# Patient Record
Sex: MALE | Race: WHITE | NOT HISPANIC OR LATINO | Employment: OTHER | URBAN - METROPOLITAN AREA
[De-identification: names, ages, dates, MRNs, and addresses within clinical notes are randomized per-mention and may not be internally consistent; named-entity substitution may affect disease eponyms.]

---

## 2017-02-22 ENCOUNTER — OFFICE VISIT (OUTPATIENT)
Dept: OPTOMETRY | Facility: CLINIC | Age: 52
End: 2017-02-22
Payer: MEDICARE

## 2017-02-22 DIAGNOSIS — H52.13 MYOPIA WITH ASTIGMATISM AND PRESBYOPIA, BILATERAL: ICD-10-CM

## 2017-02-22 DIAGNOSIS — H52.203 MYOPIA WITH ASTIGMATISM AND PRESBYOPIA, BILATERAL: ICD-10-CM

## 2017-02-22 DIAGNOSIS — H25.13 NUCLEAR SCLEROSIS, BILATERAL: Primary | ICD-10-CM

## 2017-02-22 DIAGNOSIS — H52.4 MYOPIA WITH ASTIGMATISM AND PRESBYOPIA, BILATERAL: ICD-10-CM

## 2017-02-22 PROCEDURE — 92004 COMPRE OPH EXAM NEW PT 1/>: CPT | Mod: S$PBB,,, | Performed by: OPTOMETRIST

## 2017-02-22 PROCEDURE — 92015 DETERMINE REFRACTIVE STATE: CPT | Mod: ,,, | Performed by: OPTOMETRIST

## 2017-02-22 PROCEDURE — 99999 PR PBB SHADOW E&M-EST. PATIENT-LVL II: CPT | Mod: PBBFAC,,, | Performed by: OPTOMETRIST

## 2017-02-22 PROCEDURE — 99212 OFFICE O/P EST SF 10 MIN: CPT | Mod: PBBFAC | Performed by: OPTOMETRIST

## 2017-02-22 NOTE — PROGRESS NOTES
HPI     Pt states: blurred vision for reading, has to take his glasses off to   read clearly. Would like to update glasses. No other ocular complaints  PATIENT DENIES FLASHES, FLOATERS, PAIN OR DIPLOPIA   PATIENT'S LAST DFE WAS 2/5/2014       Last edited by Kandace Beasley, PCT on 2/22/2017  2:21 PM.     ROS     Positive for: Eyes    Negative for: Constitutional, Gastrointestinal, Neurological, Skin,   Genitourinary, Musculoskeletal, HENT, Endocrine, Cardiovascular,   Respiratory, Psychiatric, Allergic/Imm, Heme/Lymph    Last edited by Denisha Cerda, OD on 2/22/2017  2:58 PM. (History)        Assessment /Plan     For exam results, see Encounter Report.    Nuclear sclerosis, bilateral    Myopia with astigmatism and presbyopia, bilateral            1.  Early-monitor.  Eye health normal OU.  2.  Bifocal rx given        RTC 1 year for routine exam.

## 2019-12-04 ENCOUNTER — HOSPITAL ENCOUNTER (OUTPATIENT)
Dept: RADIOLOGY | Facility: OTHER | Age: 54
Discharge: HOME | End: 2019-12-04
Attending: INTERNAL MEDICINE
Payer: MEDICARE

## 2019-12-04 ENCOUNTER — ANESTHESIA EVENT (OUTPATIENT)
Dept: SURGERY | Facility: OTHER | Age: 54
DRG: 470 | End: 2019-12-04
Payer: MEDICARE

## 2019-12-04 ENCOUNTER — HOSPITAL ENCOUNTER (OUTPATIENT)
Dept: PREADMISSION TESTING | Facility: OTHER | Age: 54
Discharge: HOME | End: 2019-12-04
Attending: ORTHOPAEDIC SURGERY
Payer: MEDICARE

## 2019-12-04 VITALS
RESPIRATION RATE: 16 BRPM | HEART RATE: 77 BPM | DIASTOLIC BLOOD PRESSURE: 80 MMHG | TEMPERATURE: 99 F | HEIGHT: 70 IN | SYSTOLIC BLOOD PRESSURE: 152 MMHG | OXYGEN SATURATION: 97 % | BODY MASS INDEX: 22.9 KG/M2 | WEIGHT: 160 LBS

## 2019-12-04 DIAGNOSIS — R91.8 ABNORMAL X-RAY OF LUNG: Primary | ICD-10-CM

## 2019-12-04 DIAGNOSIS — R91.8 ABNORMAL X-RAY OF LUNG: ICD-10-CM

## 2019-12-04 DIAGNOSIS — M87.051 AVASCULAR NECROSIS OF HIP, RIGHT: Primary | ICD-10-CM

## 2019-12-04 LAB
ABO + RH BLD: NORMAL
ALBUMIN SERPL BCP-MCNC: 4 G/DL (ref 3.5–5.2)
ALP SERPL-CCNC: 50 U/L (ref 55–135)
ALT SERPL W/O P-5'-P-CCNC: 16 U/L (ref 10–44)
ANION GAP SERPL CALC-SCNC: 9 MMOL/L (ref 8–16)
AST SERPL-CCNC: 21 U/L (ref 10–40)
BASOPHILS # BLD AUTO: 0.04 K/UL (ref 0–0.2)
BASOPHILS NFR BLD: 0.7 % (ref 0–1.9)
BILIRUB SERPL-MCNC: 0.5 MG/DL (ref 0.1–1)
BILIRUB UR QL STRIP: NEGATIVE
BLD GP AB SCN CELLS X3 SERPL QL: NORMAL
BUN SERPL-MCNC: 14 MG/DL (ref 6–20)
CALCIUM SERPL-MCNC: 9.3 MG/DL (ref 8.7–10.5)
CHLORIDE SERPL-SCNC: 102 MMOL/L (ref 95–110)
CLARITY UR: CLEAR
CO2 SERPL-SCNC: 26 MMOL/L (ref 23–29)
COLOR UR: YELLOW
CREAT SERPL-MCNC: 1.1 MG/DL (ref 0.5–1.4)
DIFFERENTIAL METHOD: ABNORMAL
EOSINOPHIL # BLD AUTO: 0 K/UL (ref 0–0.5)
EOSINOPHIL NFR BLD: 0.7 % (ref 0–8)
ERYTHROCYTE [DISTWIDTH] IN BLOOD BY AUTOMATED COUNT: 16.8 % (ref 11.5–14.5)
EST. GFR  (AFRICAN AMERICAN): >60 ML/MIN/1.73 M^2
EST. GFR  (NON AFRICAN AMERICAN): >60 ML/MIN/1.73 M^2
GLUCOSE SERPL-MCNC: 96 MG/DL (ref 70–110)
GLUCOSE UR QL STRIP: ABNORMAL
HCT VFR BLD AUTO: 48.2 % (ref 40–54)
HGB BLD-MCNC: 15.7 G/DL (ref 14–18)
HGB UR QL STRIP: NEGATIVE
IMM GRANULOCYTES # BLD AUTO: 0.01 K/UL (ref 0–0.04)
IMM GRANULOCYTES NFR BLD AUTO: 0.2 % (ref 0–0.5)
KETONES UR QL STRIP: NEGATIVE
LEUKOCYTE ESTERASE UR QL STRIP: NEGATIVE
LYMPHOCYTES # BLD AUTO: 1.7 K/UL (ref 1–4.8)
LYMPHOCYTES NFR BLD: 28.2 % (ref 18–48)
MCH RBC QN AUTO: 30.7 PG (ref 27–31)
MCHC RBC AUTO-ENTMCNC: 32.6 G/DL (ref 32–36)
MCV RBC AUTO: 94 FL (ref 82–98)
MONOCYTES # BLD AUTO: 0.5 K/UL (ref 0.3–1)
MONOCYTES NFR BLD: 8.3 % (ref 4–15)
NEUTROPHILS # BLD AUTO: 3.8 K/UL (ref 1.8–7.7)
NEUTROPHILS NFR BLD: 61.9 % (ref 38–73)
NITRITE UR QL STRIP: NEGATIVE
NRBC BLD-RTO: 0 /100 WBC
PH UR STRIP: 6 [PH] (ref 5–8)
PLATELET # BLD AUTO: 164 K/UL (ref 150–350)
PMV BLD AUTO: 10.8 FL (ref 9.2–12.9)
POTASSIUM SERPL-SCNC: 4.1 MMOL/L (ref 3.5–5.1)
PROT SERPL-MCNC: 7.4 G/DL (ref 6–8.4)
PROT UR QL STRIP: NEGATIVE
RBC # BLD AUTO: 5.12 M/UL (ref 4.6–6.2)
SODIUM SERPL-SCNC: 137 MMOL/L (ref 136–145)
SP GR UR STRIP: 1.01 (ref 1–1.03)
URN SPEC COLLECT METH UR: ABNORMAL
UROBILINOGEN UR STRIP-ACNC: NEGATIVE EU/DL
WBC # BLD AUTO: 6.06 K/UL (ref 3.9–12.7)

## 2019-12-04 PROCEDURE — 71046 X-RAY EXAM CHEST 2 VIEWS: CPT | Mod: 26,,, | Performed by: RADIOLOGY

## 2019-12-04 PROCEDURE — 71046 X-RAY EXAM CHEST 2 VIEWS: CPT | Mod: TC,FY

## 2019-12-04 PROCEDURE — 85025 COMPLETE CBC W/AUTO DIFF WBC: CPT

## 2019-12-04 PROCEDURE — 71046 XR CHEST PA AND LATERAL: ICD-10-PCS | Mod: 26,,, | Performed by: RADIOLOGY

## 2019-12-04 PROCEDURE — 86850 RBC ANTIBODY SCREEN: CPT

## 2019-12-04 PROCEDURE — 36415 COLL VENOUS BLD VENIPUNCTURE: CPT

## 2019-12-04 PROCEDURE — 80053 COMPREHEN METABOLIC PANEL: CPT

## 2019-12-04 PROCEDURE — 81003 URINALYSIS AUTO W/O SCOPE: CPT

## 2019-12-04 RX ORDER — TESTOSTERONE CYPIONATE 1000 MG/10ML
160 INJECTION, SOLUTION INTRAMUSCULAR WEEKLY
COMMUNITY

## 2019-12-04 RX ORDER — PREGABALIN 75 MG/1
75 CAPSULE ORAL
Status: CANCELLED | OUTPATIENT
Start: 2019-12-04 | End: 2019-12-04

## 2019-12-04 RX ORDER — ACETAMINOPHEN 500 MG
1000 TABLET ORAL
Status: CANCELLED | OUTPATIENT
Start: 2019-12-04 | End: 2019-12-04

## 2019-12-04 RX ORDER — FAMOTIDINE 20 MG/1
20 TABLET, FILM COATED ORAL
Status: CANCELLED | OUTPATIENT
Start: 2019-12-04 | End: 2019-12-04

## 2019-12-04 RX ORDER — SODIUM CHLORIDE, SODIUM LACTATE, POTASSIUM CHLORIDE, CALCIUM CHLORIDE 600; 310; 30; 20 MG/100ML; MG/100ML; MG/100ML; MG/100ML
INJECTION, SOLUTION INTRAVENOUS CONTINUOUS
Status: CANCELLED | OUTPATIENT
Start: 2019-12-04

## 2019-12-04 RX ORDER — OXYCODONE HYDROCHLORIDE 5 MG/1
10 CAPSULE ORAL EVERY 4 HOURS PRN
COMMUNITY

## 2019-12-04 RX ORDER — LIDOCAINE HYDROCHLORIDE 10 MG/ML
0.5 INJECTION, SOLUTION EPIDURAL; INFILTRATION; INTRACAUDAL; PERINEURAL ONCE
Status: CANCELLED | OUTPATIENT
Start: 2019-12-04 | End: 2019-12-04

## 2019-12-04 RX ORDER — OXYCODONE HCL 10 MG/1
10 TABLET, FILM COATED, EXTENDED RELEASE ORAL 4 TIMES DAILY
COMMUNITY

## 2019-12-04 RX ORDER — ACETAMINOPHEN 500 MG
1000 TABLET ORAL EVERY 6 HOURS PRN
Status: ON HOLD | COMMUNITY
End: 2023-06-13 | Stop reason: HOSPADM

## 2019-12-04 RX ORDER — CELECOXIB 200 MG/1
400 CAPSULE ORAL
Status: CANCELLED | OUTPATIENT
Start: 2019-12-04 | End: 2019-12-04

## 2019-12-04 NOTE — DISCHARGE INSTRUCTIONS
PRE-ADMIT TESTING -  [phone]    [address]  [facility]          Your surgery has been scheduled at [facility]. We are pleased to have the opportunity to serve you. For Further Information please call [phone].    On the day of surgery please report to the Information Desk on the 1st floor.    · CONTACT YOUR PHYSICIAN'S OFFICE THE DAY PRIOR TO YOUR SURGERY TO OBTAIN YOUR ARRIVAL TIME.     · The evening before surgery do not eat anything after 9 p.m. ( this includes hard candy, chewing gum and mints).  You may only have GATORADE, POWERADE AND WATER  from 9 p.m. until you leave your home.   DO NOT DRINK ANY LIQUIDS ON THE WAY TO THE HOSPITAL.      SPECIAL MEDICATION INSTRUCTIONS: TAKE medications checked off by the Anesthesiologist on your Medication List.    Angiogram Patients: Take medications as instructed by your physician, including aspirin.     Surgery Patients:    If you take ASPIRIN - Your PHYSICIAN/SURGEON will need to inform you IF/OR when you need to stop taking aspirin prior to your surgery.     Do Not take any medications containing IBUPROFEN.  Do Not Wear any make-up or dark nail polish   (especially eye make-up) to surgery. If you come to surgery with makeup on you will be required to remove the makeup or nail polish.    Do not shave your surgical area at least 5 days prior to your surgery. The surgical prep will be performed at the hospital according to Infection Control regulations.    Leave all valuables at home.   Do Not wear any jewelry or watches, including any metal in body piercings. Jewelry must be removed prior to coming to the hospital.  There is a possibility that rings that are unable to be removed may be cut off if they are on the surgical extremity.    Contact Lens must be removed before surgery. Either do not wear the contact lens or bring a case and solution for storage.  Please bring a container for eyeglasses or dentures as required.  Bring  any paperwork your physician has provided, such as consent forms,  history and physicals, doctor's orders, etc.   Bring comfortable clothes that are loose fitting to wear upon discharge. Take into consideration the type of surgery being performed.  Maintain your diet as advised per your physician the day prior to surgery.      Adequate rest the night before surgery is advised.   Park in the Parking lot behind the hospital or in the [facility] across the street from the parking lot. Parking is complimentary.  If you will be discharged the same day as your procedure, please arrange for a responsible adult to drive you home or to accompany you if traveling by taxi.   YOU WILL NOT BE PERMITTED TO DRIVE OR TO LEAVE THE HOSPITAL ALONE AFTER SURGERY.   It is strongly recommended that you arrange for someone to remain with you for the first 24 hrs following your surgery.    The Surgeon will speak to your family/visitor after your surgery regarding the outcome of your surgery and post op care.  The Surgeon may speak to you after your surgery, but there is a possibility you may not remember the details.  Please check with your family members regarding the conversation with the Surgeon.    We strongly recommend whoever is bringing you home be present for discharge instructions.  This will ensure a thorough understanding for your post op home care.    EACH PATIENT IS ALLOWED TWO FAMILY MEMBERS OR VISITORS IN THE ROOM AND IN THE WAITING ROOMS WHILE YOU ARE IN SURGERY. ALL CHILDREN MUST ALWAYS BE ACCOMPANIED BY AN ADULT.    Thank you for your cooperation.  The Staff of [facility].                Bathing Instructions with Hibiclens     Shower the evening before and morning of your procedure with Hibiclens:   Wash your face with water and your regular face wash/soap   Apply Hibiclens directly on your skin or on a wet washcloth and wash gently. When showering: Move away from the shower stream when  applying Hibiclens to avoid rinsing off too soon.   Rinse thoroughly with warm water   Do not dilute Hibiclens         Dry off as usual, do not use any deodorant, powder, body lotions, perfume, after shave or cologne.

## 2019-12-04 NOTE — ANESTHESIA PREPROCEDURE EVALUATION
12/04/2019  Blaise Louis is a 54 y.o., male.    Anesthesia Evaluation    I have reviewed the Patient Summary Reports.    I have reviewed the Nursing Notes.   I have reviewed the Medications.     Review of Systems  Anesthesia Hx:  No problems with previous Anesthesia    Social:  Former Smoker, No Alcohol Use Marijuana   Hematology/Oncology:  Hematology Normal   Oncology Normal     Cardiovascular:  Cardiovascular Normal Exercise tolerance: good     Pulmonary:  Pulmonary Normal    Renal/:   Chronic Renal Disease renal calculi    Hepatic/GI:   Liver Disease, Hepatitis, C    Musculoskeletal:   Arthritis   Spine Disorders: lumbar Chronic Pain    Neurological:   Chronic Pain Syndrome   Endocrine:  Endocrine Normal    Dermatological:  Skin Normal    Psych:   Opioid addition. On Methadone.         Physical Exam  General:  Well nourished    Airway/Jaw/Neck:  Airway Findings: Mouth Opening: Normal Tongue: Normal  General Airway Assessment: Adult, Good  Mallampati: I  TM Distance: Normal, at least 6 cm  Jaw/Neck Findings:  Neck ROM: Normal ROM      Dental:  Dental Findings: In tact, Lower retainer        Mental Status:  Mental Status Findings:  Cooperative, Alert and Oriented         Anesthesia Plan  Type of Anesthesia, risks & benefits discussed:  Anesthesia Type:  general  Patient's Preference: patients requests general anesthesia over spinal  Intra-op Monitoring Plan: standard ASA monitors  Intra-op Monitoring Plan Comments:   Post Op Pain Control Plan: per primary service following discharge from PACU and multimodal analgesia  Post Op Pain Control Plan Comments:   Induction:   IV  Beta Blocker:         Informed Consent: Patient understands risks and agrees with Anesthesia plan.  Questions answered. Anesthesia consent signed with patient.  ASA Score: 2     Day of Surgery Review of History & Physical:    H&P  update referred to the surgeon.     Anesthesia Plan Notes: Clearance on paper chart. Labs pending.        Ready For Surgery From Anesthesia Perspective.

## 2019-12-06 NOTE — NURSING
Total Joint Replacement Questionnaire     Blaise Louis participated in Joint Class Dec 4    1. Have you ever had a Joint Replacement?   []Yes  [x] No    2. How many stairs/steps do you have to enter your home? 14  When going up, on what side is the railing?  [x] Left  [] Right  [] Bilateral  [] None    3. Do you own any Durable Medical Equipment?   [] Rolling Walker  [] Standard Walker  [] Rollator  [] Cane  [] Crutches  [] Bed Side Commode  [] Hip Kit  [] Tub Transfer Bench  [] Shower Chair     4. Have you used a Home Health Company before?   [x] Yes  [] No  If Yes, Name of Company: ?  Would you like to use this company again?   [] Yes  [] No  [] Would you like to use your physician's preference?  [] Yes  [] No    5. Have you arranged for someone to help you, for at least for 3 days, when you are discharged from the hospital?  [x] Yes  [] No  If Yes, Name(s) of person assisting: Renée Duran  12/6/2019

## 2019-12-06 NOTE — DISCHARGE INSTRUCTIONS
Hip Replacement Discharge Instructions    1. Pain:  a. After surgery you may feel some pain in the operative leg groin area. This is normal. Your hip will likely have been injected with a numbing medicine (Exparel) prior to completion of surgery for pain control. This is indicated on a green bracelet that you will wear for 4 days after surgery. You will also get a prescription for pain control before you leave the hospital.  b. Elevate your leg when sitting for comfort  2. Incision Care:  a. Some drainage from the incision in the first 72 hours is normal. If drainage is excessive, remove bandage,  pat dry, cover with sterile gauze, and secure with tape. Notify M.D. for excessive drainage.  b. Staples will be removed 14 days after surgery.  3. Activity:  a. See attached hip precautions and follow for 6 weeks (instructions found at the end of packet):  b. Perform exercises 3 times a day.  c. Use a hard, flat surface, such as a firm mattress, when exercising.  d. You may shower 2 days after surgery providing the dressing is waterproof. Support/help is mandatory during showering. If dressing becomes wet, replace with a new dressing. No bathing or swimming for 6 weeks or until incision is completely healed.  e. Wear arnaldo hose for 3 weeks after surgery. You may remove for 1-2 hours during the day only.Send patient home with an extra pair arnaldo hose.  4. Safety:  a. Add cushions to low chairs and car seats for elevation.  b. When getting in and out of a car, it is important to keep your leg straight and out to the side. Wear a seatbelt at all times.  c. You will be given a raised toilet seat (3-1 commode).  d. Use a walker, cane, or crutches as long as M.D. recommends.  5. Possible Complications: Report to Surgeon  a. Infection  i. Unexpected redness  ii. Persistent drainage  iii. Temperature ,can be treated with tylenol. Do not go to the emergency room or urgent care for a temperature, call your surgeon.   iii. Additional  swelling  iv. Pain not controlled with current pain medicine  b. Blood clot  i. Unusual pain  ii. Red or discolored skin  iii. Swelling  iv. Unusual warm skin  c. Dislocation  i. Severe hip pain especially when moved  ii. The injured leg is shorter than the uninjured leg  iii. The injured leg lies in an abnormal position. In most cases the leg is bent at the hip, turned inward and pulled toward the middle of the body.

## 2019-12-09 ENCOUNTER — HOSPITAL ENCOUNTER (INPATIENT)
Facility: OTHER | Age: 54
LOS: 1 days | Discharge: HOME HEALTH | DRG: 470 | End: 2019-12-10
Attending: ORTHOPAEDIC SURGERY | Admitting: ORTHOPAEDIC SURGERY
Payer: MEDICARE

## 2019-12-09 ENCOUNTER — ANESTHESIA (OUTPATIENT)
Dept: SURGERY | Facility: OTHER | Age: 54
DRG: 470 | End: 2019-12-09
Payer: MEDICARE

## 2019-12-09 DIAGNOSIS — M87.051 AVASCULAR NECROSIS OF HIP, RIGHT: Primary | ICD-10-CM

## 2019-12-09 PROCEDURE — 25000003 PHARM REV CODE 250: Performed by: NURSE ANESTHETIST, CERTIFIED REGISTERED

## 2019-12-09 PROCEDURE — 63600175 PHARM REV CODE 636 W HCPCS: Performed by: SPECIALIST

## 2019-12-09 PROCEDURE — 11000001 HC ACUTE MED/SURG PRIVATE ROOM

## 2019-12-09 PROCEDURE — S0109 METHADONE ORAL 5MG: HCPCS

## 2019-12-09 PROCEDURE — 63600175 PHARM REV CODE 636 W HCPCS

## 2019-12-09 PROCEDURE — 63600175 PHARM REV CODE 636 W HCPCS: Performed by: ORTHOPAEDIC SURGERY

## 2019-12-09 PROCEDURE — 63600175 PHARM REV CODE 636 W HCPCS: Performed by: NURSE ANESTHETIST, CERTIFIED REGISTERED

## 2019-12-09 PROCEDURE — 27201423 OPTIME MED/SURG SUP & DEVICES STERILE SUPPLY: Performed by: ORTHOPAEDIC SURGERY

## 2019-12-09 PROCEDURE — 25000003 PHARM REV CODE 250: Performed by: NURSE PRACTITIONER

## 2019-12-09 PROCEDURE — 25000003 PHARM REV CODE 250: Performed by: SPECIALIST

## 2019-12-09 PROCEDURE — 37000009 HC ANESTHESIA EA ADD 15 MINS: Performed by: ORTHOPAEDIC SURGERY

## 2019-12-09 PROCEDURE — 25000003 PHARM REV CODE 250: Performed by: ORTHOPAEDIC SURGERY

## 2019-12-09 PROCEDURE — 97116 GAIT TRAINING THERAPY: CPT

## 2019-12-09 PROCEDURE — 36000710: Performed by: ORTHOPAEDIC SURGERY

## 2019-12-09 PROCEDURE — 94799 UNLISTED PULMONARY SVC/PX: CPT

## 2019-12-09 PROCEDURE — 25000003 PHARM REV CODE 250: Performed by: ANESTHESIOLOGY

## 2019-12-09 PROCEDURE — 97530 THERAPEUTIC ACTIVITIES: CPT

## 2019-12-09 PROCEDURE — 71000039 HC RECOVERY, EACH ADD'L HOUR: Performed by: ORTHOPAEDIC SURGERY

## 2019-12-09 PROCEDURE — 94761 N-INVAS EAR/PLS OXIMETRY MLT: CPT

## 2019-12-09 PROCEDURE — 36000711: Performed by: ORTHOPAEDIC SURGERY

## 2019-12-09 PROCEDURE — 71000033 HC RECOVERY, INTIAL HOUR: Performed by: ORTHOPAEDIC SURGERY

## 2019-12-09 PROCEDURE — C1713 ANCHOR/SCREW BN/BN,TIS/BN: HCPCS | Performed by: ORTHOPAEDIC SURGERY

## 2019-12-09 PROCEDURE — C9290 INJ, BUPIVACAINE LIPOSOME: HCPCS | Performed by: ORTHOPAEDIC SURGERY

## 2019-12-09 PROCEDURE — 97161 PT EVAL LOW COMPLEX 20 MIN: CPT

## 2019-12-09 PROCEDURE — 37000008 HC ANESTHESIA 1ST 15 MINUTES: Performed by: ORTHOPAEDIC SURGERY

## 2019-12-09 PROCEDURE — 25000003 PHARM REV CODE 250

## 2019-12-09 PROCEDURE — C1776 JOINT DEVICE (IMPLANTABLE): HCPCS | Performed by: ORTHOPAEDIC SURGERY

## 2019-12-09 PROCEDURE — 63600175 PHARM REV CODE 636 W HCPCS: Performed by: ANESTHESIOLOGY

## 2019-12-09 DEVICE — LINER POLY 36MM: Type: IMPLANTABLE DEVICE | Site: HIP | Status: FUNCTIONAL

## 2019-12-09 DEVICE — STEM FEMORAL 12/14 12X128MM TT: Type: IMPLANTABLE DEVICE | Site: HIP | Status: FUNCTIONAL

## 2019-12-09 DEVICE — SCREW BONE 6.5X30 SELF-TAP: Type: IMPLANTABLE DEVICE | Site: HIP | Status: FUNCTIONAL

## 2019-12-09 DEVICE — SCREW BONE SELF TAP 6.5X40: Type: IMPLANTABLE DEVICE | Site: HIP | Status: FUNCTIONAL

## 2019-12-09 DEVICE — CUP SHELL TM MOD W/CLUST 50MM: Type: IMPLANTABLE DEVICE | Site: HIP | Status: FUNCTIONAL

## 2019-12-09 DEVICE — HEAD FEM BIOLOX DELTA 36X-3.5: Type: IMPLANTABLE DEVICE | Site: HIP | Status: FUNCTIONAL

## 2019-12-09 RX ORDER — TRANEXAMIC ACID 100 MG/ML
INJECTION, SOLUTION INTRAVENOUS
Status: DISCONTINUED | OUTPATIENT
Start: 2019-12-09 | End: 2019-12-09

## 2019-12-09 RX ORDER — CELECOXIB 200 MG/1
200 CAPSULE ORAL 2 TIMES DAILY
Status: DISCONTINUED | OUTPATIENT
Start: 2019-12-09 | End: 2019-12-10 | Stop reason: HOSPADM

## 2019-12-09 RX ORDER — CELECOXIB 200 MG/1
400 CAPSULE ORAL
Status: COMPLETED | OUTPATIENT
Start: 2019-12-09 | End: 2019-12-09

## 2019-12-09 RX ORDER — METHADONE HYDROCHLORIDE 5 MG/5ML
30 SOLUTION ORAL 3 TIMES DAILY
Status: DISCONTINUED | OUTPATIENT
Start: 2019-12-09 | End: 2019-12-10 | Stop reason: HOSPADM

## 2019-12-09 RX ORDER — FAMOTIDINE 20 MG/1
20 TABLET, FILM COATED ORAL
Status: COMPLETED | OUTPATIENT
Start: 2019-12-09 | End: 2019-12-09

## 2019-12-09 RX ORDER — HYDROMORPHONE HYDROCHLORIDE 2 MG/ML
INJECTION, SOLUTION INTRAMUSCULAR; INTRAVENOUS; SUBCUTANEOUS
Status: DISCONTINUED | OUTPATIENT
Start: 2019-12-09 | End: 2019-12-09

## 2019-12-09 RX ORDER — BACITRACIN 50000 [IU]/1
INJECTION, POWDER, FOR SOLUTION INTRAMUSCULAR
Status: DISCONTINUED | OUTPATIENT
Start: 2019-12-09 | End: 2019-12-09 | Stop reason: HOSPADM

## 2019-12-09 RX ORDER — POLYETHYLENE GLYCOL 3350 17 G/17G
17 POWDER, FOR SOLUTION ORAL DAILY
Status: DISCONTINUED | OUTPATIENT
Start: 2019-12-09 | End: 2019-12-10 | Stop reason: HOSPADM

## 2019-12-09 RX ORDER — PREGABALIN 75 MG/1
75 CAPSULE ORAL
Status: COMPLETED | OUTPATIENT
Start: 2019-12-09 | End: 2019-12-09

## 2019-12-09 RX ORDER — VANCOMYCIN HCL IN 5 % DEXTROSE 1G/250ML
15 PLASTIC BAG, INJECTION (ML) INTRAVENOUS
Status: DISCONTINUED | OUTPATIENT
Start: 2019-12-09 | End: 2019-12-10

## 2019-12-09 RX ORDER — DOCUSATE SODIUM 100 MG/1
100 CAPSULE, LIQUID FILLED ORAL EVERY 12 HOURS
Status: DISCONTINUED | OUTPATIENT
Start: 2019-12-09 | End: 2019-12-10 | Stop reason: HOSPADM

## 2019-12-09 RX ORDER — ONDANSETRON HYDROCHLORIDE 2 MG/ML
INJECTION, SOLUTION INTRAMUSCULAR; INTRAVENOUS
Status: DISCONTINUED | OUTPATIENT
Start: 2019-12-09 | End: 2019-12-09

## 2019-12-09 RX ORDER — ROCURONIUM BROMIDE 10 MG/ML
INJECTION, SOLUTION INTRAVENOUS
Status: DISCONTINUED | OUTPATIENT
Start: 2019-12-09 | End: 2019-12-09

## 2019-12-09 RX ORDER — FENTANYL CITRATE 50 UG/ML
INJECTION, SOLUTION INTRAMUSCULAR; INTRAVENOUS
Status: DISCONTINUED | OUTPATIENT
Start: 2019-12-09 | End: 2019-12-09

## 2019-12-09 RX ORDER — DEXTROSE MONOHYDRATE AND SODIUM CHLORIDE 5; .9 G/100ML; G/100ML
INJECTION, SOLUTION INTRAVENOUS CONTINUOUS
Status: DISCONTINUED | OUTPATIENT
Start: 2019-12-09 | End: 2019-12-10 | Stop reason: HOSPADM

## 2019-12-09 RX ORDER — DIPHENHYDRAMINE HYDROCHLORIDE 50 MG/ML
25 INJECTION INTRAMUSCULAR; INTRAVENOUS EVERY 8 HOURS PRN
Status: DISCONTINUED | OUTPATIENT
Start: 2019-12-09 | End: 2019-12-10 | Stop reason: HOSPADM

## 2019-12-09 RX ORDER — HYDROMORPHONE HYDROCHLORIDE 2 MG/ML
0.4 INJECTION, SOLUTION INTRAMUSCULAR; INTRAVENOUS; SUBCUTANEOUS EVERY 5 MIN PRN
Status: DISCONTINUED | OUTPATIENT
Start: 2019-12-09 | End: 2019-12-09 | Stop reason: HOSPADM

## 2019-12-09 RX ORDER — OXYCODONE HCL 10 MG/1
10 TABLET, FILM COATED, EXTENDED RELEASE ORAL 4 TIMES DAILY
Status: DISCONTINUED | OUTPATIENT
Start: 2019-12-09 | End: 2019-12-10 | Stop reason: HOSPADM

## 2019-12-09 RX ORDER — OXYCODONE HYDROCHLORIDE 5 MG/1
5 TABLET ORAL EVERY 4 HOURS PRN
Status: DISCONTINUED | OUTPATIENT
Start: 2019-12-09 | End: 2019-12-09

## 2019-12-09 RX ORDER — CEFAZOLIN SODIUM 1 G/3ML
2 INJECTION, POWDER, FOR SOLUTION INTRAMUSCULAR; INTRAVENOUS
Status: DISCONTINUED | OUTPATIENT
Start: 2019-12-09 | End: 2019-12-10

## 2019-12-09 RX ORDER — LIDOCAINE HCL/PF 100 MG/5ML
SYRINGE (ML) INTRAVENOUS
Status: DISCONTINUED | OUTPATIENT
Start: 2019-12-09 | End: 2019-12-09

## 2019-12-09 RX ORDER — CLONAZEPAM 0.5 MG/1
0.5 TABLET ORAL 3 TIMES DAILY
Status: DISCONTINUED | OUTPATIENT
Start: 2019-12-09 | End: 2019-12-10 | Stop reason: HOSPADM

## 2019-12-09 RX ORDER — KETOROLAC TROMETHAMINE 30 MG/ML
INJECTION, SOLUTION INTRAMUSCULAR; INTRAVENOUS
Status: DISCONTINUED | OUTPATIENT
Start: 2019-12-09 | End: 2019-12-09

## 2019-12-09 RX ORDER — ACETAMINOPHEN 500 MG
1000 TABLET ORAL EVERY 8 HOURS
Status: DISCONTINUED | OUTPATIENT
Start: 2019-12-09 | End: 2019-12-10 | Stop reason: HOSPADM

## 2019-12-09 RX ORDER — SODIUM CHLORIDE 0.9 % (FLUSH) 0.9 %
3 SYRINGE (ML) INJECTION
Status: DISCONTINUED | OUTPATIENT
Start: 2019-12-09 | End: 2019-12-10 | Stop reason: HOSPADM

## 2019-12-09 RX ORDER — MEPERIDINE HYDROCHLORIDE 25 MG/ML
12.5 INJECTION INTRAMUSCULAR; INTRAVENOUS; SUBCUTANEOUS ONCE AS NEEDED
Status: COMPLETED | OUTPATIENT
Start: 2019-12-09 | End: 2019-12-09

## 2019-12-09 RX ORDER — SODIUM CHLORIDE, SODIUM LACTATE, POTASSIUM CHLORIDE, CALCIUM CHLORIDE 600; 310; 30; 20 MG/100ML; MG/100ML; MG/100ML; MG/100ML
INJECTION, SOLUTION INTRAVENOUS CONTINUOUS
Status: DISCONTINUED | OUTPATIENT
Start: 2019-12-09 | End: 2019-12-10

## 2019-12-09 RX ORDER — OXYCODONE HYDROCHLORIDE 5 MG/1
5 TABLET ORAL ONCE
Status: COMPLETED | OUTPATIENT
Start: 2019-12-09 | End: 2019-12-09

## 2019-12-09 RX ORDER — OXYCODONE HYDROCHLORIDE 5 MG/1
10 TABLET ORAL EVERY 4 HOURS PRN
Status: DISCONTINUED | OUTPATIENT
Start: 2019-12-09 | End: 2019-12-10 | Stop reason: HOSPADM

## 2019-12-09 RX ORDER — GLYCOPYRROLATE 0.2 MG/ML
INJECTION INTRAMUSCULAR; INTRAVENOUS
Status: DISCONTINUED | OUTPATIENT
Start: 2019-12-09 | End: 2019-12-09

## 2019-12-09 RX ORDER — SODIUM CHLORIDE 0.9 % (FLUSH) 0.9 %
10 SYRINGE (ML) INJECTION
Status: DISCONTINUED | OUTPATIENT
Start: 2019-12-09 | End: 2019-12-10 | Stop reason: HOSPADM

## 2019-12-09 RX ORDER — ONDANSETRON 2 MG/ML
4 INJECTION INTRAMUSCULAR; INTRAVENOUS DAILY PRN
Status: DISCONTINUED | OUTPATIENT
Start: 2019-12-09 | End: 2019-12-09 | Stop reason: HOSPADM

## 2019-12-09 RX ORDER — LIDOCAINE HYDROCHLORIDE 10 MG/ML
0.5 INJECTION, SOLUTION EPIDURAL; INFILTRATION; INTRACAUDAL; PERINEURAL ONCE
Status: DISCONTINUED | OUTPATIENT
Start: 2019-12-09 | End: 2019-12-09 | Stop reason: HOSPADM

## 2019-12-09 RX ORDER — OXYCODONE HYDROCHLORIDE 5 MG/1
5 TABLET ORAL
Status: DISCONTINUED | OUTPATIENT
Start: 2019-12-09 | End: 2019-12-09 | Stop reason: HOSPADM

## 2019-12-09 RX ORDER — PROPOFOL 10 MG/ML
VIAL (ML) INTRAVENOUS
Status: DISCONTINUED | OUTPATIENT
Start: 2019-12-09 | End: 2019-12-09

## 2019-12-09 RX ORDER — ASPIRIN 325 MG
325 TABLET ORAL EVERY 12 HOURS
Status: DISCONTINUED | OUTPATIENT
Start: 2019-12-10 | End: 2019-12-10 | Stop reason: HOSPADM

## 2019-12-09 RX ORDER — ACETAMINOPHEN 500 MG
1000 TABLET ORAL
Status: COMPLETED | OUTPATIENT
Start: 2019-12-09 | End: 2019-12-09

## 2019-12-09 RX ORDER — BISACODYL 10 MG
10 SUPPOSITORY, RECTAL RECTAL DAILY PRN
Status: DISCONTINUED | OUTPATIENT
Start: 2019-12-09 | End: 2019-12-10 | Stop reason: HOSPADM

## 2019-12-09 RX ORDER — BUPIVACAINE HCL/EPINEPHRINE 0.25-.0005
VIAL (ML) INJECTION
Status: DISCONTINUED | OUTPATIENT
Start: 2019-12-09 | End: 2019-12-09 | Stop reason: HOSPADM

## 2019-12-09 RX ORDER — ONDANSETRON 2 MG/ML
8 INJECTION INTRAMUSCULAR; INTRAVENOUS EVERY 8 HOURS PRN
Status: DISCONTINUED | OUTPATIENT
Start: 2019-12-09 | End: 2019-12-10 | Stop reason: HOSPADM

## 2019-12-09 RX ORDER — CEFAZOLIN SODIUM 2 G/50ML
2 SOLUTION INTRAVENOUS
Status: COMPLETED | OUTPATIENT
Start: 2019-12-09 | End: 2019-12-10

## 2019-12-09 RX ORDER — MIDAZOLAM HYDROCHLORIDE 1 MG/ML
INJECTION INTRAMUSCULAR; INTRAVENOUS
Status: DISCONTINUED | OUTPATIENT
Start: 2019-12-09 | End: 2019-12-09

## 2019-12-09 RX ORDER — MUPIROCIN 20 MG/G
1 OINTMENT TOPICAL 2 TIMES DAILY
Status: DISCONTINUED | OUTPATIENT
Start: 2019-12-09 | End: 2019-12-10 | Stop reason: HOSPADM

## 2019-12-09 RX ORDER — FAMOTIDINE 20 MG/1
20 TABLET, FILM COATED ORAL 2 TIMES DAILY
Status: DISCONTINUED | OUTPATIENT
Start: 2019-12-09 | End: 2019-12-10 | Stop reason: HOSPADM

## 2019-12-09 RX ORDER — VANCOMYCIN HCL IN 5 % DEXTROSE 1G/250ML
15 PLASTIC BAG, INJECTION (ML) INTRAVENOUS
Status: COMPLETED | OUTPATIENT
Start: 2019-12-09 | End: 2019-12-10

## 2019-12-09 RX ORDER — KETOROLAC TROMETHAMINE 30 MG/ML
INJECTION, SOLUTION INTRAMUSCULAR; INTRAVENOUS
Status: DISCONTINUED | OUTPATIENT
Start: 2019-12-09 | End: 2019-12-09 | Stop reason: HOSPADM

## 2019-12-09 RX ORDER — ACETAMINOPHEN 325 MG/1
650 TABLET ORAL EVERY 6 HOURS PRN
Status: DISCONTINUED | OUTPATIENT
Start: 2019-12-10 | End: 2019-12-10 | Stop reason: HOSPADM

## 2019-12-09 RX ADMIN — PREGABALIN 75 MG: 75 CAPSULE ORAL at 10:12

## 2019-12-09 RX ADMIN — HYDROMORPHONE HYDROCHLORIDE 1 MG: 2 INJECTION, SOLUTION INTRAMUSCULAR; INTRAVENOUS; SUBCUTANEOUS at 12:12

## 2019-12-09 RX ADMIN — HYDROMORPHONE HYDROCHLORIDE 0.4 MG: 2 INJECTION, SOLUTION INTRAMUSCULAR; INTRAVENOUS; SUBCUTANEOUS at 01:12

## 2019-12-09 RX ADMIN — FENTANYL CITRATE 200 MCG: 50 INJECTION, SOLUTION INTRAMUSCULAR; INTRAVENOUS at 11:12

## 2019-12-09 RX ADMIN — CEFAZOLIN 2 G: 330 INJECTION, POWDER, FOR SOLUTION INTRAMUSCULAR; INTRAVENOUS at 11:12

## 2019-12-09 RX ADMIN — ONDANSETRON 4 MG: 2 INJECTION, SOLUTION INTRAMUSCULAR; INTRAVENOUS at 12:12

## 2019-12-09 RX ADMIN — DEXTROSE AND SODIUM CHLORIDE: 5; .9 INJECTION, SOLUTION INTRAVENOUS at 03:12

## 2019-12-09 RX ADMIN — OXYCODONE HYDROCHLORIDE 5 MG: 5 TABLET ORAL at 11:12

## 2019-12-09 RX ADMIN — DEXTROSE AND SODIUM CHLORIDE: 5; .9 INJECTION, SOLUTION INTRAVENOUS at 10:12

## 2019-12-09 RX ADMIN — LIDOCAINE HYDROCHLORIDE 100 MG: 20 INJECTION, SOLUTION INTRAVENOUS at 11:12

## 2019-12-09 RX ADMIN — GLYCOPYRROLATE 0.4 MG: 0.2 INJECTION, SOLUTION INTRAMUSCULAR; INTRAVENOUS at 11:12

## 2019-12-09 RX ADMIN — OXYCODONE HYDROCHLORIDE 5 MG: 5 TABLET ORAL at 06:12

## 2019-12-09 RX ADMIN — SODIUM CHLORIDE, SODIUM LACTATE, POTASSIUM CHLORIDE, AND CALCIUM CHLORIDE: 600; 310; 30; 20 INJECTION, SOLUTION INTRAVENOUS at 12:12

## 2019-12-09 RX ADMIN — CLONAZEPAM 0.5 MG: 0.5 TABLET ORAL at 08:12

## 2019-12-09 RX ADMIN — FAMOTIDINE 20 MG: 20 TABLET ORAL at 10:12

## 2019-12-09 RX ADMIN — POLYETHYLENE GLYCOL 3350 17 G: 17 POWDER, FOR SOLUTION ORAL at 05:12

## 2019-12-09 RX ADMIN — CLONAZEPAM 0.5 MG: 0.5 TABLET ORAL at 05:12

## 2019-12-09 RX ADMIN — OXYCODONE HYDROCHLORIDE 5 MG: 5 TABLET ORAL at 01:12

## 2019-12-09 RX ADMIN — ACETAMINOPHEN 1000 MG: 500 TABLET ORAL at 11:12

## 2019-12-09 RX ADMIN — OXYCODONE HYDROCHLORIDE 10 MG: 10 TABLET, FILM COATED, EXTENDED RELEASE ORAL at 08:12

## 2019-12-09 RX ADMIN — MIDAZOLAM HYDROCHLORIDE 2 MG: 1 INJECTION, SOLUTION INTRAMUSCULAR; INTRAVENOUS at 11:12

## 2019-12-09 RX ADMIN — ACETAMINOPHEN 1000 MG: 500 TABLET, FILM COATED ORAL at 10:12

## 2019-12-09 RX ADMIN — METHADONE HYDROCHLORIDE 30 MG: 5 SOLUTION ORAL at 05:12

## 2019-12-09 RX ADMIN — MUPIROCIN 1 G: 20 OINTMENT TOPICAL at 08:12

## 2019-12-09 RX ADMIN — CELECOXIB 200 MG: 200 CAPSULE ORAL at 08:12

## 2019-12-09 RX ADMIN — VANCOMYCIN HYDROCHLORIDE 1000 MG: 1 INJECTION, POWDER, LYOPHILIZED, FOR SOLUTION INTRAVENOUS at 11:12

## 2019-12-09 RX ADMIN — ROCURONIUM BROMIDE 40 MG: 10 INJECTION, SOLUTION INTRAVENOUS at 11:12

## 2019-12-09 RX ADMIN — PROPOFOL 200 MG: 10 INJECTION, EMULSION INTRAVENOUS at 11:12

## 2019-12-09 RX ADMIN — CEFAZOLIN SODIUM 2 G: 2 SOLUTION INTRAVENOUS at 06:12

## 2019-12-09 RX ADMIN — MEPERIDINE HYDROCHLORIDE 12.5 MG: 25 INJECTION INTRAMUSCULAR; INTRAVENOUS; SUBCUTANEOUS at 01:12

## 2019-12-09 RX ADMIN — CELECOXIB 400 MG: 200 CAPSULE ORAL at 10:12

## 2019-12-09 RX ADMIN — DOCUSATE SODIUM 100 MG: 100 CAPSULE, LIQUID FILLED ORAL at 08:12

## 2019-12-09 RX ADMIN — TRANEXAMIC ACID 1400 MG: 100 INJECTION, SOLUTION INTRAVENOUS at 11:12

## 2019-12-09 RX ADMIN — SODIUM CHLORIDE, SODIUM LACTATE, POTASSIUM CHLORIDE, AND CALCIUM CHLORIDE: 600; 310; 30; 20 INJECTION, SOLUTION INTRAVENOUS at 11:12

## 2019-12-09 RX ADMIN — GLYCOPYRROLATE 0.8 MG: 0.2 INJECTION, SOLUTION INTRAMUSCULAR; INTRAVENOUS at 12:12

## 2019-12-09 RX ADMIN — FAMOTIDINE 20 MG: 20 TABLET ORAL at 08:12

## 2019-12-09 RX ADMIN — FENTANYL CITRATE 50 MCG: 50 INJECTION, SOLUTION INTRAMUSCULAR; INTRAVENOUS at 12:12

## 2019-12-09 RX ADMIN — METHADONE HYDROCHLORIDE 30 MG: 5 SOLUTION ORAL at 08:12

## 2019-12-09 RX ADMIN — KETOROLAC TROMETHAMINE 30 MG: 30 INJECTION, SOLUTION INTRAMUSCULAR; INTRAVENOUS at 12:12

## 2019-12-09 NOTE — INTERVAL H&P NOTE
The patient has been examined and the H&P has been reviewed:    I concur with the findings and no changes have occurred since H&P was written.    Anesthesia/Surgery risks, benefits and alternative options discussed and understood by patient/family.          Active Hospital Problems    Diagnosis  POA    Avascular necrosis of hip, right [M87.051]  Yes      Resolved Hospital Problems   No resolved problems to display.

## 2019-12-09 NOTE — PLAN OF CARE
[facility]       [address]       [facility] [address]       [phone]               [facility] Orthopedic Discharge Orders    Home [place]           Expected Discharge Date: 12/10    Diagnoses:  Post-op  right hip(s) replacement    Patient is homebound due to:   Pt requires home health services due to taxing effort to leave the home as a result of immobility from Post-op right hip(s) replacement    Weight Bearing Status:   full weight bearing: right leg     Posterior Hip Precautions for 6 weeks, AVOID:  -Avoid greater than 90 degrees of flexion, internal rotation, and adduction  -Avoid extension, external rotation, and abduction  -Must sleep with hip abduction pillow or regular pillow b/t legs    Physical Therapy   3 times a week for 2 weeks (Call for further orders)  - Ambulate with a rolling walker  - Progress to cane  -Instruct on ROM and strengthening of knee  -Set up for outpt once staples removed and MOD 1 with cane    Wound Care:   If patient is discharged with aqua lucie/silver dressing, leave on for 5 days unless saturated border to border, then follow instructions below:  Cleanse with wound cleanser or normal saline and apply island dressing.  If island dressing not available apply gauze and tegaderm.  Change surgical dressing 3 times a week and PRN  in standing position.  Teach patient to change daily if draining.  Pt may shower if surgical dressing is waterproof.. Removal and replacement of dressing after shower only needed if incision is suspected to have gotten wet during shower.  Otherwise change as previously described depending on dressing/drainage. No soaking in the tub or hot tub  for 6 weeks.    Wear  TEDS Bilateral Knee High Stockings for 3  Weeks. OK to remove stockings 1-2 hours/day max if desired.    PT/SN to remove staples 14 days Post-op and apply skin prep and steri-strips.    Cold therapy/Ice: encouraged at least 20 minutes 2-3 times daily or more if desired.   Incision must be kept waterproof while icing.      Contact:  Please contact the nurse practitioner, Karena at [phone] at Ext [phone]. with concerns.  She is in surgery M,W,F so if urgent and needs to be addressed prior to the end of the day call the  and they with contact her in the OR or Clinic.     BLOOD THINNER:    If sent home on Xarelto         -14 days post-op for TKR       -30 days post-op for THR     If sent home home on ASA    325mg   BID x 4 weeks     Once finished with prescribed blood thinner, patients can return to pre-surgical ASA dosage if they took ASA before surgery.     Outpatient Therapy: [facility]    [address] [address]   or  [address] [place] [address]    Call [phone] to schedule appointment  Fax [phone]    If need orders: Call Suzy at Ext [phone]        DME:  - rolling Walker  - 3 in 1 commode  - tub bench / shower chair  - Hip Kit  - Per PT/OT recommendation  - Other:Emiliano Wynn

## 2019-12-09 NOTE — PROGRESS NOTES
Attempted to initiate IV access x4 by two different RNs with no success. Patient sent to holding area with Vancomycin.

## 2019-12-09 NOTE — OR NURSING
"The patent is lying on the bed AAOx4, afebrile, Sats % on RA, pain reassessed on a scale of 0-10, the patient stated "4". We are waiting the arrival of a transporter to help assist with the transporting of the patient in the bed.   "

## 2019-12-09 NOTE — OP NOTE
[facility]  Operative Report    SUMMARY     Surgery Date: 12/9/2019     Surgeon(s) and Role:     * Elliott Wynn MD - Primary    Assistant: CHRISTIAN Donahue FA    Pre-op Diagnosis:  Avascular necrosis of hip, right [M87.051]    Post-op Diagnosis:  Post-Op Diagnosis Codes:     * Avascular necrosis of hip, right [M87.051]    Procedure(s) (LRB):  ARTHROPLASTY, HIP TOTAL (Right) (Mahi TM hip)    Anesthesia: Spinal    Description of Procedure: Appropriate consent was signed. The patient understood   and accepted all risks and complications. The patient was brought to the Operating Room after undergoing spinal anesthetic. Angulo catheter was placed. The patient was then placed in a lateral decubitus position on a peg board with all bony   prominences padded. Perineal drape was applied. The Operative hip and lower extremity were then prepped and draped in a sterile manner and a mini posterior approach was utilized. Dissection was taken down to fascia, which was incised and   gluteus gurdeep muscle split in line of its fibers.The Charnley retractor was then   placed and the hip internally rotated. The external rotators and capsule were   taken off as one flap and peeled back to protect the sciatic nerve. It was   tagged with #5 Ethibond suture. Leg was then levelled and 2 pins were placed, 1  in the greater trochanter and 1 in the iliac crest and distance measured 9.6  cm. Pin was then removed from greater trochanter and hip dislocated. A femoral  neck osteotomy was performed in 20 mm above the lesser trochanter as   templated on preoperative x-rays. Femoral head was removed and proximal femur   was exposed. It was opened up with the cookie cutter, starter reamer and   lateralizing reamer. Trabecular metal reamers were utilized up to 12 mm and   broaching was performed to 12 mm. A thrombin-soaked sponge is placed in the   proximal femur and attention was then directed to the acetabulum.    Labrum and soft  tissue were excised and reaming was begun with a 46 mm reamer   and progressed to 50 mm. A 50 mm press-fit trabecular metal cup with cluster   holes was then impacted obtaining excellent fixation. 2 dome screws were placed  enhancing fixation. A 36 mm neutral highly cross-linked polyethylene liner was  then snapped in the place and checked for loosening. Osteophytes were removed   from around the acetabulum.    Attention was then directed back to the proximal femur where the trial 12 mm   trabecular metal broach was placed and trials were performed. A standard neck   was required along with a 36 mm head -3.5 mm neck. Leg length measured 9.7   cm. Broach is then removed and a 12 mm t trabecular metal stem was then impacted in the proximal femur obtaining excellent fixation. A 36 mm   Ceramic head -3.5 mm neck was then impacted on the dried trunnion   relocated brought through full range of motion and found to be quite stable.   The hip was then washed out copiously with antibiotic solution through the   Pulsavac. The external rotators and capsule were reattached to trochanteric   attachment through drill holes utilizing #5 Ethibond suture. Exparel cocktail   was injected into the fascia and subcutaneous tissue. Fascia was closed with a   running #2 Quill suture. Subcutaneous closure was obtained with #1 and 2-0   Vicryl. Skin was then closed with staples and a sterile compressive dressing   was applied. The patient was placed in abduction pillow and brought to Recovery  Room in good condition.    Estimated Blood Loss: 300cc         Specimens:   Specimen (12h ago, onward)    None

## 2019-12-09 NOTE — ANESTHESIA PROCEDURE NOTES
Intubation  Performed by: Bethel Maloney Jr., CRNA  Authorized by: Alli Moseley MD     Intubation:     Induction:  Intravenous    Intubated:  Postinduction    Mask Ventilation:  Easy mask    Attempts:  1    Attempted By:  CRNA    Method of Intubation:  Video laryngoscopy    Blade:  Munoz 3    Laryngeal View Grade: Grade I - full view of chords      Difficult Airway Encountered?: No      Complications:  None    Airway Device:  Oral endotracheal tube    Airway Device Size:  7.5    Style/Cuff Inflation:  Cuffed    Tube secured:  22    Secured at:  The lips    Placement Verified By:  Capnometry    Complicating Factors:  None    Findings Post-Intubation:  BS equal bilateral

## 2019-12-09 NOTE — PROGRESS NOTES
Pt. Oriented to room, call light in reach.  VSS.  Dressing in place. Scant drainage noted.  Robert's/SCD's applied.  Maintenance fluids administered.  Neuro checks performed. Abduction pillow in place.  Pt. In NAD. Will cont. To monitor.

## 2019-12-09 NOTE — CONSULTS
Consult Note  MED    Consult Requested By: Elliott Wynn MD  Reason for Consult: anxiety/pain control/nephrolithiasis/AVN    SUBJECTIVE:     History of Present Illness:  Patient is a 54 y.o. male presents with good right hip repair secondary to avascular necrosis.  Seen postop in PACU and doing well.  Vital signs stable.  Preop and epic reviewed.  Denies chest pain, shortness of breath, nausea, vomiting, diarrhea, fever, chills .    Past Medical History:   Diagnosis Date    Anxiety     Arthritis     Back pain     Cervical disc disorder with radiculopathy     Encounter for blood transfusion     Fever blister     Hepatitis C     History of acne     Joint pain     Kidney stones      Past Surgical History:   Procedure Laterality Date    ANTERIOR CERVICAL CORPECTOMY W/ FUSION      APPENDECTOMY      SPINE SURGERY      2 c-spine surg. from a 1991 accident     Family History   Problem Relation Age of Onset    Cancer Father         prostate cancer    Diabetes Father     Melanoma Neg Hx      Social History     Tobacco Use    Smoking status: Former Smoker     Last attempt to quit: 10/9/2004     Years since quitting: 15.1    Smokeless tobacco: Never Used   Substance Use Topics    Alcohol use: No     Comment: rare    Drug use: Yes     Types: Marijuana     Comment: daily       Review of patient's allergies indicates:   Allergen Reactions    Dilantin [phenytoin sodium extended] Hives        Review of Systems:  Constitutional: No fever or chills  Respiratory: No cough or shortness of breath  Cardiovascular: No chest pain or palpitations  Gastrointestinal: No nausea or vomiting  Neurological: No confusion or weakness    OBJECTIVE:     Vital Signs (Most Recent)  Temp: 98.3 °F (36.8 °C) (12/09/19 1415)  Pulse: 74 (12/09/19 1430)  Resp: 18 (12/09/19 1430)  BP: 132/78 (12/09/19 1430)  SpO2: 100 % (12/09/19 1430)    Vital Signs Range (Last 24H):  Temp:  [97.4 °F (36.3 °C)-98.3 °F (36.8 °C)]   Pulse:  []    Resp:  [18-20]   BP: (132-193)/()   SpO2:  [98 %-100 %]       Intake/Output Summary (Last 24 hours) at 12/9/2019 1452  Last data filed at 12/9/2019 1246  Gross per 24 hour   Intake 1000 ml   Output --   Net 1000 ml       Physical Exam:  General appearance: Well developed, well nourished  Eyes:  Conjunctivae/corneas clear. PERRL.  Lungs: Normal respiratory effort,   clear to auscultation bilaterally   Heart: Regular rate and rhythm, S1, S2 normal, no murmur, rub or zurdo.  Abdomen: Soft, non-tender non-distended; bowel sounds normal; no masses,  no organomegaly  Extremities: No cyanosis or clubbing. No edema.    Skin: Skin color, texture, turgor normal. No rashes or lesions.  Multiple tattoos noted  Neurologic: Normal strength and tone. No focal numbness or weakness   Right hip clear dry and intact      Laboratory:  No results for input(s): WBC, RBC, HGB, HCT, PLT, MCV, MCH, MCHC in the last 24 hours.  BMP:   Recent Labs   Lab 12/04/19  1122   GLU 96      K 4.1      CO2 26   BUN 14   CREATININE 1.1   CALCIUM 9.3     Lab Results   Component Value Date    CALCIUM 9.3 12/04/2019     BNP  No results for input(s): BNP, BNPTRIAGEBLO in the last 168 hours.  Lab Results   Component Value Date    URICACID 6.4 08/07/2008   No results found for: IRON, TIBC, FERRITIN, SATURATEDIRO  Lab Results   Component Value Date    CALCIUM 9.3 12/04/2019       Diagnostic Results:  X-Ray Hip 1 View Right   Final Result      Expected postoperative changes from total right hip arthroplasty as above.         Electronically signed by: Manoj Ceja MD   Date:    12/09/2019   Time:    13:40          ASSESSMENT/PLAN:     1. S/P Right Hip (M87.051):  Secondary to AVN.  Defer to ortho/therapy teams.  2. Pain control:  Methadone/oxy per ortho team.   3. Anxiety (F41.9):  clonazepam prn.   4. Hx of Hep C:  undetectable after cannabis oil???  5. Hx of nephrolithiasis:  Maintain hydration.    6. DVT prophy:  ASA BID    Thanks for  consult  See above  Will follow along.

## 2019-12-09 NOTE — ANESTHESIA POSTPROCEDURE EVALUATION
Anesthesia Post Evaluation    Patient: Blaise Louis    Procedure(s) Performed: Procedure(s) (LRB):  ARTHROPLASTY, HIP TOTAL (Right)    Final Anesthesia Type: general    Patient location during evaluation: PACU  Patient participation: Yes- Able to Participate  Level of consciousness: awake and alert  Post-procedure vital signs: reviewed and stable  Pain management: adequate  Airway patency: patent    PONV status at discharge: No PONV  Anesthetic complications: no      Cardiovascular status: blood pressure returned to baseline  Respiratory status: unassisted and spontaneous ventilation  Hydration status: euvolemic  Follow-up not needed.          Vitals Value Taken Time   /85 12/9/2019  1:30 PM   Temp 36.3 °C (97.4 °F) 12/9/2019 10:05 AM   Pulse 83 12/9/2019  1:34 PM   Resp 18 12/9/2019 10:05 AM   SpO2 100 % 12/9/2019  1:34 PM   Vitals shown include unvalidated device data.      No case tracking events are documented in the log.      Pain/Charles Score: Pain Rating Prior to Med Admin: 7 (12/9/2019  1:25 PM)

## 2019-12-09 NOTE — PLAN OF CARE
BRAVO met with pt at bedside to complete discharge assessment, verified PCP and uses [facility] on [address] and [address].  Pt's wife will provide transportation home.  Pt needs RW, BSC and wants TTB and agreed to be billed $82.  BRAVO gave pt a list of  agencies and pt would like to be placed with Dr. Wynn's preferred provider and signed choice form.     12/09/19 7278   Discharge Assessment   Assessment Type Discharge Planning Assessment   Confirmed/corrected address and phone number on facesheet? Yes   Assessment information obtained from? Patient   Communicated expected length of stay with patient/caregiver no   Prior to hospitilization cognitive status: Alert/Oriented   Prior to hospitalization functional status: Independent   Current cognitive status: Alert/Oriented   Current Functional Status: Assistive Equipment;Needs Assistance   Lives With spouse   Able to Return to Prior Arrangements yes   Is patient able to care for self after discharge? Unable to determine at this time (comments)   Who are your caregiver(s) and their phone number(s)?   (spouse)   Readmission Within the Last 30 Days no previous admission in last 30 days   Patient currently being followed by outpatient case management? No   Patient currently receives any other outside agency services? No   Equipment Currently Used at Home none   Do you have any problems affording any of your prescribed medications? No   Is the patient taking medications as prescribed? yes   Does the patient have transportation home? Yes   Transportation Anticipated family or friend will provide   Does the patient receive services at the [facility]? No   Discharge Plan A Home Health   DME Needed Upon Discharge  bedside commode;bath bench;walker, rolling   Patient/Family in Agreement with Plan yes

## 2019-12-09 NOTE — TRANSFER OF CARE
"Anesthesia Transfer of Care Note    Patient: Blaise Louis    Procedure(s) Performed: Procedure(s) (LRB):  ARTHROPLASTY, HIP TOTAL (Right)    Patient location: PACU    Anesthesia Type: general    Transport from OR: Transported from OR on 2-3 L/min O2 by NC with adequate spontaneous ventilation    Post pain: adequate analgesia    Post assessment: no apparent anesthetic complications    Post vital signs: stable    Level of consciousness: awake    Nausea/Vomiting: no nausea/vomiting    Complications: none    Transfer of care protocol was followed      Last vitals:   Visit Vitals  /72 (BP Location: Left arm, Patient Position: Lying)   Pulse 83   Temp 36.3 °C (97.4 °F) (Oral)   Resp 18   Ht 5' 10" (1.778 m)   Wt 72.6 kg (160 lb)   SpO2 98%   BMI 22.96 kg/m²     "

## 2019-12-10 VITALS
SYSTOLIC BLOOD PRESSURE: 150 MMHG | OXYGEN SATURATION: 99 % | HEIGHT: 70 IN | TEMPERATURE: 98 F | HEART RATE: 76 BPM | WEIGHT: 160 LBS | BODY MASS INDEX: 22.9 KG/M2 | RESPIRATION RATE: 19 BRPM | DIASTOLIC BLOOD PRESSURE: 84 MMHG

## 2019-12-10 LAB
ERYTHROCYTE [DISTWIDTH] IN BLOOD BY AUTOMATED COUNT: 17.4 % (ref 11.5–14.5)
HCT VFR BLD AUTO: 37.1 % (ref 40–54)
HGB BLD-MCNC: 12.2 G/DL (ref 14–18)
MCH RBC QN AUTO: 31.1 PG (ref 27–31)
MCHC RBC AUTO-ENTMCNC: 32.9 G/DL (ref 32–36)
MCV RBC AUTO: 95 FL (ref 82–98)
PLATELET # BLD AUTO: 127 K/UL (ref 150–350)
PMV BLD AUTO: 11.8 FL (ref 9.2–12.9)
RBC # BLD AUTO: 3.92 M/UL (ref 4.6–6.2)
WBC # BLD AUTO: 9.34 K/UL (ref 3.9–12.7)

## 2019-12-10 PROCEDURE — 85027 COMPLETE CBC AUTOMATED: CPT

## 2019-12-10 PROCEDURE — 36415 COLL VENOUS BLD VENIPUNCTURE: CPT

## 2019-12-10 PROCEDURE — 94761 N-INVAS EAR/PLS OXIMETRY MLT: CPT

## 2019-12-10 PROCEDURE — 99900035 HC TECH TIME PER 15 MIN (STAT)

## 2019-12-10 PROCEDURE — 25000003 PHARM REV CODE 250

## 2019-12-10 PROCEDURE — 97530 THERAPEUTIC ACTIVITIES: CPT

## 2019-12-10 PROCEDURE — 63600175 PHARM REV CODE 636 W HCPCS

## 2019-12-10 PROCEDURE — S0109 METHADONE ORAL 5MG: HCPCS

## 2019-12-10 PROCEDURE — 25000003 PHARM REV CODE 250: Performed by: ORTHOPAEDIC SURGERY

## 2019-12-10 PROCEDURE — 97535 SELF CARE MNGMENT TRAINING: CPT

## 2019-12-10 PROCEDURE — 25000003 PHARM REV CODE 250: Performed by: NURSE PRACTITIONER

## 2019-12-10 PROCEDURE — 97116 GAIT TRAINING THERAPY: CPT

## 2019-12-10 PROCEDURE — 97110 THERAPEUTIC EXERCISES: CPT

## 2019-12-10 PROCEDURE — 97166 OT EVAL MOD COMPLEX 45 MIN: CPT

## 2019-12-10 RX ORDER — ASPIRIN 325 MG
325 TABLET, DELAYED RELEASE (ENTERIC COATED) ORAL 2 TIMES DAILY
Qty: 60 TABLET | Refills: 0 | Status: ON HOLD | OUTPATIENT
Start: 2019-12-10 | End: 2023-06-13 | Stop reason: HOSPADM

## 2019-12-10 RX ADMIN — CEFAZOLIN SODIUM 2 G: 2 SOLUTION INTRAVENOUS at 03:12

## 2019-12-10 RX ADMIN — POLYETHYLENE GLYCOL 3350 17 G: 17 POWDER, FOR SOLUTION ORAL at 08:12

## 2019-12-10 RX ADMIN — FAMOTIDINE 20 MG: 20 TABLET ORAL at 08:12

## 2019-12-10 RX ADMIN — OXYCODONE HYDROCHLORIDE 10 MG: 5 TABLET ORAL at 03:12

## 2019-12-10 RX ADMIN — CELECOXIB 200 MG: 200 CAPSULE ORAL at 08:12

## 2019-12-10 RX ADMIN — METHADONE HYDROCHLORIDE 30 MG: 5 SOLUTION ORAL at 08:12

## 2019-12-10 RX ADMIN — ASPIRIN 325 MG ORAL TABLET 325 MG: 325 PILL ORAL at 08:12

## 2019-12-10 RX ADMIN — CLONAZEPAM 0.5 MG: 0.5 TABLET ORAL at 08:12

## 2019-12-10 RX ADMIN — OXYCODONE HYDROCHLORIDE 10 MG: 10 TABLET, FILM COATED, EXTENDED RELEASE ORAL at 08:12

## 2019-12-10 RX ADMIN — OXYCODONE HYDROCHLORIDE 10 MG: 5 TABLET ORAL at 07:12

## 2019-12-10 RX ADMIN — ACETAMINOPHEN 1000 MG: 500 TABLET ORAL at 05:12

## 2019-12-10 RX ADMIN — OXYCODONE HYDROCHLORIDE 10 MG: 5 TABLET ORAL at 11:12

## 2019-12-10 RX ADMIN — MUPIROCIN 1 G: 20 OINTMENT TOPICAL at 08:12

## 2019-12-10 RX ADMIN — DOCUSATE SODIUM 100 MG: 100 CAPSULE, LIQUID FILLED ORAL at 08:12

## 2019-12-10 NOTE — PROGRESS NOTES
"Progress Note  Orthopedics    Admit Date: 12/9/2019   Patient ID: Blaise Louis is a 54 y.o. male.  Postop day 1.  Right total hip replacement. Dressing was slight bloody drainage.  Neurovascularly intact. Ambulated 130 ft yesterday.  Will talk to his pain management doctor in regards to his pain medication postop.  Plan for discharge today with home health.  Follow-up in 4 weeks.  Instructed on hip precautions.            Elliott Wynn      Vital Sign (recent):  BP (!) 150/84 (BP Location: Right arm, Patient Position: Sitting)   Pulse 76   Temp 98 °F (36.7 °C) (Oral)   Resp 19   Ht 5' 10" (1.778 m)   Wt 72.6 kg (160 lb)   SpO2 99%   BMI 22.96 kg/m²       Laboratory:    CBC:   Recent Labs   Lab 12/10/19  0428   WBC 9.34   RBC 3.92*   HGB 12.2*   HCT 37.1*   *   MCV 95   MCH 31.1*   MCHC 32.9       CMP:   Recent Labs   Lab 12/04/19  1122   GLU 96   CALCIUM 9.3   ALBUMIN 4.0   PROT 7.4      K 4.1   CO2 26      BUN 14   CREATININE 1.1   ALKPHOS 50*   ALT 16   AST 21   BILITOT 0.5           Intake/Output Summary (Last 24 hours) at 12/10/2019 0825  Last data filed at 12/10/2019 0716  Gross per 24 hour   Intake 2743.33 ml   Output 1625 ml   Net 1118.33 ml         Current Medications:   acetaminophen  1,000 mg Oral Q8H    aspirin  325 mg Oral Q12H    celecoxib  200 mg Oral BID    clonazePAM  0.5 mg Oral TID    docusate sodium  100 mg Oral Q12H    famotidine  20 mg Oral BID    methadone  30 mg Oral TID    mupirocin  1 g Nasal BID    oxyCODONE  10 mg Oral QID    polyethylene glycol  17 g Oral Daily       Continuous Infusions:   dextrose 5 % and 0.9 % NaCl Stopped (12/10/19 0716)     PRN Meds:.acetaminophen, bisacodyl, diphenhydrAMINE, ondansetron, oxyCODONE, promethazine (PHENERGAN) IVPB, sodium chloride 0.9%, sodium chloride 0.9%  "

## 2019-12-10 NOTE — PLAN OF CARE
PT eval.  Able to amb 100' with RW and CGA with cueing for gait sequence.  Anticipate dc tomorrow but may need 2 PT sessions.  18 steps to 2nd floor bed and full bath.   REC:  home with HH  DME:  RW, 3 in 1, TTB and hip kit

## 2019-12-10 NOTE — PT/OT/SLP EVAL
Physical Therapy Evaluation and treatment    Patient Name:  Blaise Louis   MRN:  [id number]    Recommendations:     Discharge Recommendations:  home with home health, home health PT, home health OT   Discharge Equipment Recommendations: 3-in-1 commode with oval seat , walker, rolling, bath bench(hip kit)   Barriers to discharge: Inaccessible home    Assessment:     Blaise Louis is a 54 y.o. male admitted with a medical diagnosis of Avascular necrosis of hip, right.  He presents with the following impairments/functional limitations:  weakness, impaired endurance, impaired functional mobilty, gait instability, impaired balance, decreased lower extremity function, decreased ROM, orthopedic precautions, decreased safety awareness, pain , Anticipate dc tomorrow but may need 2 PT sessions.  18 steps to 2nd floor bed and full bath.     Rehab Prognosis: Good; patient would benefit from acute skilled PT services to address these deficits and reach maximum level of function.    Recent Surgery: Procedure(s) (LRB):  ARTHROPLASTY, HIP TOTAL (Right) Day of Surgery    Plan:     During this hospitalization, patient to be seen BID to address the identified rehab impairments via gait training, therapeutic activities, therapeutic exercises and progress toward the following goals:    · Plan of Care Expires:  01/08/20    Subjective     Chief Complaint: R hip pain  Patient/Family Comments/goals: states he has been taking pain meds for almost 30 years.     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  On Methadone- I take that every day but I also need something for pain. Goal is to be I and pain free in hip   Pain/Comfort:  · Pain Rating 1: 7/10  · Location - Side 1: Right  · Location - Orientation 1: generalized  · Location 1: hip  · Pain Addressed 1: Pre-medicate for activity, Reposition, Distraction, Cessation of Activity, Nurse notified  · Pain Rating Post-Intervention 1: 8/10    Patients cultural, spiritual, Baptism conflicts  given the current situation: no    Living Environment:  Pt lives with spouse in 2 story house with 1 TEAGAN from front door.  Only has 1/2 bath down and no bedrooms.  18 steps to 2nd floor with L HR.  1 bathroom has tub shower combo and other one has shower stall.  Standard toilet   Prior to admission, patients level of function was mod I for ADL and amb without AD.  Pt is able to drive but on permanent disability since injury in the [employer] .  Equipment used at home: none.  DME owned (not currently used): none.  Upon discharge, patient will have assistance from spouse.    Objective:     Communicated with nurse prior to session.  Patient found HOB elevated with hip abduction pillow, peripheral IV, SCD  upon PT entry to room.    General Precautions: Standard, fall   Orthopedic Precautions:RLE weight bearing as tolerated, RLE posterior precautions   Braces: N/A     Exams:  · Cognitive Exam:  Patient is oriented to Person, Place, Time and Situation  · Gross Motor Coordination:  Impaired on R 2* pain  · Postural Exam:  Patient presented with the following abnormalities:    · -       Rounded shoulders  · Sensation:    · -       Intact  light/touch BLE  · Skin Integrity/Edema:      · -       Skin integrity: R hip incision with surgical bandage   · -       Edema: R hip and thigh  · RLE ROM: Deficits: limited by hip precautions, ankle WFL  · RLE Strength: Deficits: needs assist to move leg, fair quad contraction, ankle WFL  · LLE ROM: WFL  · LLE Strength: WFL    Functional Mobility:  · Bed Mobility:     · Supine to Sit: minimum assistance and mod cueing to observe hip precautions  · Sit to Supine: not assessed-left up in chair  · Transfers:     · Sit to Stand:  minimum assistance with rolling walker and cues for hand placement  · Gait: pt amb 100' with RW and CGA, step to gait with increased step length on R.  instructed in reciprocal heel toe gait.  decreased stephy and stride  · Balance: fair standing      Therapeutic  Activities and Exercises:   PT eval.  Instructed in hip precautions, gait as above.  Ankle pumps x 10     AM-PAC 6 CLICK MOBILITY  Total Score:18     Patient left up in chair with all lines intact, call button in reach, nurse notified and wife present.    GOALS:   Multidisciplinary Problems     Physical Therapy Goals        Problem: Physical Therapy Goal    Goal Priority Disciplines Outcome Goal Variances Interventions   Physical Therapy Goal     PT, PT/OT Ongoing, Progressing     Description:  Goals to be met by: 19     Patient will increase functional independence with mobility by performin. Supine to sit with SBA.   2. Sit to supine with SBA.   3. Sit<>stand transfer with SBA using rolling walker.   4. Gait > 150 feet with SBA using rolling walker.   5. Ascend/descend 18 stairs with L handrails with CGA   6. Able to name 3/3 hip precautions                      History:     Past Medical History:   Diagnosis Date    Anxiety     Arthritis     Back pain     Cervical disc disorder with radiculopathy     Encounter for blood transfusion     Fever blister     Hepatitis C     History of acne     Joint pain     Kidney stones        Past Surgical History:   Procedure Laterality Date    ANTERIOR CERVICAL CORPECTOMY W/ FUSION      APPENDECTOMY      SPINE SURGERY      2 c-spine surg. from a  accident       Time Tracking:     PT Received On: 19  PT Start Time: 1722     PT Stop Time: 1800  PT Total Time (min): 38 min     Billable Minutes: Evaluation 15, Gait Training 14 and Therapeutic Activity 9      Sharon Smith, PT  2019

## 2019-12-10 NOTE — PT/OT/SLP PROGRESS
Physical Therapy Treatment    Patient Name:  Blaise Louis   MRN:  [id number]    Recommendations:     Discharge Recommendations:  home with home health, home health PT, home health OT   Discharge Equipment Recommendations: 3-in-1 commode, walker, rolling, bath bench(hip kit)   Barriers to discharge: Inaccessible home    Assessment:     Blaise Louis is a 54 y.o. male admitted with a medical diagnosis of Avascular necrosis of hip, right.  He presents with the following impairments/functional limitations:  weakness, impaired endurance, impaired functional mobilty, gait instability, impaired balance, decreased lower extremity function, decreased ROM, orthopedic precautions, decreased safety awareness, pain .  Progressing toward goals.  Pain limiting factor.  May need 2 sessions of PT tomorrow pending progress on stairs.  Pt has 18 to get to bedroom on  2nd floor     Rehab Prognosis: Good; patient would benefit from acute skilled PT services to address these deficits and reach maximum level of function.    Recent Surgery: Procedure(s) (LRB):  ARTHROPLASTY, HIP TOTAL (Right) Day of Surgery    Plan:     During this hospitalization, patient to be seen BID to address the identified rehab impairments via gait training, therapeutic activities, therapeutic exercises and progress toward the following goals:    · Plan of Care Expires:  01/08/20    Subjective     Chief Complaint: hip pain  Patient/Family Comments/goals: the nurse said she is working on getting me my pain meds   Pain/Comfort:  · Pain Rating 1: 8/10  · Location - Side 1: Right  · Location - Orientation 1: generalized  · Location 1: hip  · Pain Addressed 1: Reposition, Distraction, Cessation of Activity, Nurse notified(due pain med at 9pm but can give a little earlier per charge nurse  )  · Pain Rating Post-Intervention 1: 9/10      Objective:     Communicated with nurse prior to session.  Patient found up in chair with peripheral IV upon PT entry to room.      General Precautions: Standard, fall   Orthopedic Precautions:RLE weight bearing as tolerated, RLE posterior precautions   Braces: N/A     Functional Mobility:  · Bed Mobility:     · Supine to Sit: up in chair  · Sit to Supine: minimum assistance  · Transfers:     · Sit to Stand:  stand by assistance with rolling walker and cues to maintain hip precautions , hand placement  · Gait: pt amb 130' with RW and CGA with cues for reciprocal gait.  decreased stephy and stride length  · Balance: fair standing      AM-PAC 6 CLICK MOBILITY  Turning over in bed (including adjusting bedclothes, sheets and blankets)?: 3  Sitting down on and standing up from a chair with arms (e.g., wheelchair, bedside commode, etc.): 3  Moving from lying on back to sitting on the side of the bed?: 3  Moving to and from a bed to a chair (including a wheelchair)?: 3  Need to walk in hospital room?: 3  Climbing 3-5 steps with a railing?: 3  Basic Mobility Total Score: 18       Therapeutic Activities and Exercises:   gait as above.  Reviewed hip precautions and position in bed .  Allow small bend at knee portion of bed in attempt to decrease hip pain.  Cautioned about being up too high.      Patient left HOB elevated with all lines intact, call button in reach, bed alarm on and nurse notified..    GOALS:   Multidisciplinary Problems     Physical Therapy Goals        Problem: Physical Therapy Goal    Goal Priority Disciplines Outcome Goal Variances Interventions   Physical Therapy Goal     PT, PT/OT Ongoing, Progressing     Description:  Goals to be met by: 19     Patient will increase functional independence with mobility by performin. Supine to sit with SBA.   2. Sit to supine with SBA.   3. Sit<>stand transfer with SBA using rolling walker.   4. Gait > 150 feet with SBA using rolling walker.   5. Ascend/descend 18 stairs with L handrails with CGA   6. Able to name 3/3 hip precautions                      Time Tracking:     PT  Received On: 12/09/19  PT Start Time: 1942     PT Stop Time: 2009  PT Total Time (min): 27 min     Billable Minutes: Gait Training 16 and Therapeutic Activity 11    Treatment Type: Treatment  PT/PTA: PT     PTA Visit Number: 0     Sharon Smith, PT  12/09/2019

## 2019-12-10 NOTE — NURSING
Discharge instructions reviewed with pt and spouse at length and verbalized 100% understanding.Extra pair knee high arnaldo hose sent home with pt. Saline lock dc with cath intact and site without redness or swelling

## 2019-12-10 NOTE — PT/OT/SLP DISCHARGE
Occupational Therapy Discharge Summary    Blaise Louis  MRN: [id number]   Principal Problem: Avascular necrosis of hip, right      Patient Discharged from acute Occupational Therapy on 12/10/19.  Please refer to prior OT note dated 12/10/19 for functional status.    Assessment:      Patient appropriate for care in another setting.    Objective:     GOALS:   Multidisciplinary Problems     Occupational Therapy Goals        Problem: Occupational Therapy Goal    Goal Priority Disciplines Outcome Interventions   Occupational Therapy Goal     OT, PT/OT Ongoing, Progressing    Description:  Goals to be met by: 12/17/19     Patient will increase functional independence with ADLs by performing:    LE Dressing with Supervision.  Grooming while standing at sink with Supervision.  Toileting from bedside commode with Supervision for hygiene and clothing management.   Toilet transfer to bedside commode with Supervision.                      Reasons for Discontinuation of Therapy Services  Transfer to alternate level of care.      Plan:     Patient Discharged to: Home with Home Health Service    Maria Luisa Monreal OT  12/10/2019

## 2019-12-10 NOTE — PROGRESS NOTES
"  Progress Note    Admit Date: 12/9/2019   LOS: 1 day     SUBJECTIVE:     Follow-up For:  Avascular necrosis of hip, right    Interval History:     Pain control issues.  No CP/SOB/Calf pain.  Discussed with Dr. Wynn.      Review of Systems:  Constitutional: No fever or chills  Respiratory: No cough or shortness of breath  Cardiovascular: No chest pain or palpitations  Gastrointestinal: No nausea or vomiting  Neurological: No confusion or weakness    OBJECTIVE:     Vital Signs Range (Last 24H):  BP (!) 150/84 (BP Location: Right arm, Patient Position: Sitting)   Pulse 76   Temp 98 °F (36.7 °C) (Oral)   Resp 19   Ht 5' 10" (1.778 m)   Wt 72.6 kg (160 lb)   SpO2 99%   BMI 22.96 kg/m²     Temp:  [97 °F (36.1 °C)-99.5 °F (37.5 °C)]   Pulse:  []   Resp:  [16-20]   BP: (117-193)/()   SpO2:  [95 %-100 %]     I & O (Last 24H):    Intake/Output Summary (Last 24 hours) at 12/10/2019 0843  Last data filed at 12/10/2019 0716  Gross per 24 hour   Intake 2743.33 ml   Output 1625 ml   Net 1118.33 ml       Physical Exam:  General appearance: Well developed, well nourished  Eyes:  Conjunctivae/corneas clear. PERRL.  Lungs: Normal respiratory effort,   clear to auscultation bilaterally   Heart: Regular rate and rhythm, S1, S2 normal, no murmur, rub or zurdo.  Abdomen: Soft, non-tender non-distended; bowel sounds normal; no masses,  no organomegaly  Extremities: No cyanosis or clubbing. No edema.    Skin: Skin color, texture, turgor normal. No rashes or lesions  Neurologic: Normal strength and tone. No focal numbness or weakness   Right Hip CDI    Laboratory Data:  Recent Labs   Lab 12/10/19  0428   WBC 9.34   RBC 3.92*   HGB 12.2*   HCT 37.1*   *   MCV 95   MCH 31.1*   MCHC 32.9       BMP:   Recent Labs   Lab 12/04/19  1122   GLU 96      K 4.1      CO2 26   BUN 14   CREATININE 1.1   CALCIUM 9.3     Lab Results   Component Value Date    CALCIUM 9.3 12/04/2019       Lab Results   Component Value " Date    CALCIUM 9.3 12/04/2019       Lab Results   Component Value Date    URICACID 6.4 08/07/2008       BNP  No results for input(s): BNP, BNPTRIAGEBLO in the last 168 hours.    Medications:  Medication list was reviewed and changes noted under Assessment/Plan.    Diagnostic Results:        ASSESSMENT/PLAN:     1. S/P Right Hip (M87.051):  Secondary to AVN.  Defer to ortho/therapy teams.  2. Pain control:  Methadone/oxy per ortho team.   3. Anxiety (F41.9):  clonazepam prn.   4. Hx of Hep C:  undetectable after cannabis oil???  5. Hx of nephrolithiasis:  Maintain hydration.   6. Mild ABLA/Thrombocytopenia:  Check CBC next week.    7. DVT prophy:  ASA BID     Ok to DC.

## 2019-12-10 NOTE — PLAN OF CARE
Problem: Physical Therapy Goal  Goal: Physical Therapy Goal  Description  Goals to be met by: 19     Patient will increase functional independence with mobility by performin. Supine to sit with SBA.   2. Sit to supine with SBA.   3. Sit<>stand transfer with SBA using rolling walker.   4. Gait > 150 feet with SBA using rolling walker.   5. Ascend/descend 18 stairs with L handrails with CGA   6. Able to name 3/3 hip precautions      12/10/2019 1028 by Mariluz Donnelly, PTA  Outcome: Ongoing, Progressing  12/10/2019 1028 by Mariluz Donnelly, PTA  Reactivated   Pt with 7/10 pain level today. Sup to sit CGA/SBA with cueing for proper technique with keeping post hip prec. Sit to stand SBA, amb'd 150'x 2 with RW and SBA, WBAT on RLE, cueing for safety with turns damaris. Asc/desc 8 steps with L rail and CGA, able to recall 3/3 hip prec. HHPT

## 2019-12-10 NOTE — PT/OT/SLP PROGRESS
Physical Therapy Treatment    Patient Name:  Blaise Louis   MRN:  [id number]    Recommendations:     Discharge Recommendations:  home with home health, home health PT, home health OT   Discharge Equipment Recommendations: 3-in-1 commode, walker, rolling, bath bench, hip kit   Barriers to discharge: None (pt will have assistance from wife)    Assessment:     Blaise Louis is a 54 y.o. male admitted with a medical diagnosis of Avascular necrosis of hip, right.  He presents with the following impairments/functional limitations:  weakness, impaired self care skills, impaired functional mobilty, gait instability, impaired balance, decreased lower extremity function, pain, decreased ROM, edema, impaired skin, orthopedic precautions, decreased safety awareness ;pt with good mobility today, NO LOB or SOB noted, though needing lots of cueing for safety, pt moves fast at times, damaris with turns. When seated in chair pt caught leaning forward to scratch his lower leg. Inst'd in post hip prec. , again. Pt given long handle reacher to use.     Rehab Prognosis: Good; patient would benefit from acute skilled PT services to address these deficits and reach maximum level of function.    Recent Surgery: Procedure(s) (LRB):  ARTHROPLASTY, HIP TOTAL (Right) 1 Day Post-Op    Plan:     During this hospitalization, patient to be seen BID to address the identified rehab impairments via gait training, therapeutic activities, therapeutic exercises and progress toward the following goals:    · Plan of Care Expires:  01/08/20    Subjective     Chief Complaint: pain  Patient/Family Comments/goals: pt agreeable to session, able to recall 3/3 hip prec.  Pain/Comfort:  · Pain Rating 1: 7/10  · Location - Side 1: Right  · Location - Orientation 1: generalized  · Location 1: hip  · Pain Addressed 1: Pre-medicate for activity, Reposition, Distraction  · Pain Rating Post-Intervention 1: 7/10(with amb)      Objective:     Communicated with nurse  "prior to session.  Patient found supine with peripheral IV, SCD, hip abduction pillow, coldpack to R hip, upon PT entry to room.     General Precautions: Standard, fall   Orthopedic Precautions:RLE weight bearing as tolerated, RLE posterior precautions   Braces: N/A     Functional Mobility:  · Bed Mobility:     · Supine to Sit: stand by assistance, contact guard assistance and and cueing for technique  · Transfers:     · Sit to Stand:  stand by assistance with rolling walker  · Gait: amb'd 150' x 2 with RW and SBA, cueing for proper turns  · Stairs:  Pt ascended/descended 8 stair(s) with No Assistive Device with left handrail with Contact Guard Assistance.       AM-PAC 6 CLICK MOBILITY  Turning over in bed (including adjusting bedclothes, sheets and blankets)?: 3  Sitting down on and standing up from a chair with arms (e.g., wheelchair, bedside commode, etc.): 3  Moving from lying on back to sitting on the side of the bed?: 3  Moving to and from a bed to a chair (including a wheelchair)?: 3  Need to walk in hospital room?: 3  Climbing 3-5 steps with a railing?: 3  Basic Mobility Total Score: 18       Therapeutic Activities and Exercises:   reviewed LE HEP with pt performing AP's, LAQ"s, QS,GS x 10 ea.   inst'd in post hip prec., safety in/around home, car t/f's.     Patient left up in chair with all lines intact, call button in reach, nurse notified and coldpack to R hip..    GOALS:   Multidisciplinary Problems     Physical Therapy Goals     Not on file          Multidisciplinary Problems (Resolved)        Problem: Physical Therapy Goal    Goal Priority Disciplines Outcome Goal Variances Interventions   Physical Therapy Goal   (Resolved)     PT, PT/OT Met     Description:  Goals to be met by: 19     Patient will increase functional independence with mobility by performin. Supine to sit with SBA.   2. Sit to supine with SBA.   3. Sit<>stand transfer with SBA using rolling walker.   4. Gait > 150 feet " with SBA using rolling walker.   5. Ascend/descend 18 stairs with L handrails with CGA   6. Able to name 3/3 hip precautions                      Time Tracking:     PT Received On: 12/10/19  PT Start Time: 0908     PT Stop Time: 1005  PT Total Time (min): 57 min     Billable Minutes: Gait Training 30, Therapeutic Activity 15 and Therapeutic Exercise 12    Treatment Type: Treatment  PT/PTA: PTA     PTA Visit Number: 1     Mariluz Donnelly PTA  12/10/2019

## 2019-12-10 NOTE — PLAN OF CARE
Problem: Occupational Therapy Goal  Goal: Occupational Therapy Goal  Description  Goals to be met by: 12/17/19     Patient will increase functional independence with ADLs by performing:    LE Dressing with Supervision.  Grooming while standing at sink with Supervision.  Toileting from bedside commode with Supervision for hygiene and clothing management.   Toilet transfer to bedside commode with Supervision.     Outcome: Ongoing, Progressing     Initial OT eval/treat complete.  RW, 3-in-1 commode, and hip kit already delivered to room.  Recommend HH OT/PT.  Spouse can work from home and is willing and able to assist.  To benefit from continued acute care OT services to increase independence in self-care/functional transfers.  OT to follow.
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
## 2019-12-10 NOTE — PLAN OF CARE
Patient discharge home with home health, accepted by [facility]. Art, S[facility] delivered equipment to bedside. Family transport patient home. No further needs at discharge.       12/10/19 0914   Final Note   Assessment Type Final Discharge Note   Anticipated Discharge Disposition Home-Health   What phone number can be called within the next 1-3 days to see how you are doing after discharge?   ([phone])   Hospital Follow Up  Appt(s) scheduled? Yes   Discharge plans and expectations educations in teach back method with documentation complete? Yes

## 2019-12-20 NOTE — DISCHARGE SUMMARY
[facility]  Short Stay  Discharge Summary  TOTAL HIP REPLACEMENT    Admit Date: 12/9/2019    Discharge Date and Time: 12/10/2019  1:21 PM      Discharge Attending Physician: Elliott Wynn MD     Hospital Course:  Blaise Louis,is a 54 y.o. male with Avascular necrosis,   R hip, unrelieved with the conservative measures. The patient was admitted on 12/9/2019 underwent R total hip replacement.  Postoperatively, the patient did well and was weightbearing as tolerated with a walker. Blaise Louis was instructed on hip precautions.  The patient was discharged on 12/10/2019 with home health physical therapy and nursing. The patient will be on Percocet for pain and aspirin 325 mg p.o. b.i.d. with meals x4 weeks. I will see them back in the office in 4 weeks for followup.      Final Diagnoses:    Principal Problem: Avascular necrosis of hip, right   Secondary Diagnoses:   Active Hospital Problems    Diagnosis  POA    *Avascular necrosis of hip, right [M87.051]  Yes      Resolved Hospital Problems    Diagnosis Date Resolved POA    Avascular necrosis of hip, right [M87.051] 12/09/2019 Yes       Discharged Condition: good    [place] [facility]/Patient Instructions:    Medications:  Reconciled Home Medications:      Medication List      START taking these medications    aspirin 325 MG EC tablet  Commonly known as:  ECOTRIN  Take 1 tablet (325 mg total) by mouth 2 (two) times daily. For 4 weeks post op        CONTINUE taking these medications    acetaminophen 500 MG tablet  Commonly known as:  TYLENOL  Take 1,000 mg by mouth every 6 (six) hours as needed for Pain.     clonazePAM 0.5 MG tablet  Commonly known as:  KLONOPIN  Take 1 tablet (0.5 mg total) by mouth 3 (three) times daily.     methadone 10 mg/mL solution  Commonly known as:  DOLOPHINE  Take 30 mg by mouth 3 (three) times daily.     * oxyCODONE 10 mg 12 hr tablet  Commonly known as:  OXYCONTIN  Take 10 mg by mouth 4 (four)  "times daily.     * oxyCODONE 5 mg Cap  Commonly known as:  OXY-IR  Take 10 mg by mouth every 4 (four) hours as needed for Pain.     testosterone cypionate 100 mg/mL injection  Commonly known as:  DEPOTESTOTERONE CYPIONATE  Inject 160 mg into the muscle once a week.     ZANTAC ORAL  Take by mouth.         * This list has 2 medication(s) that are the same as other medications prescribed for you. Read the directions carefully, and ask your doctor or other care provider to review them with you.              Discharge Procedure Orders   COMMODE FOR HOME USE     Order Specific Question Answer Comments   Type: Standard    Height: 5' 10" (1.778 m)    Weight: 72.6 kg (160 lb)    Length of need (1-99 months): 99      WALKER FOR HOME USE     Order Specific Question Answer Comments   Type of Walker: Adult (5'4"-6'6")    With wheels? Yes    Height: 5' 10" (1.778 m)    Weight: 72.6 kg (160 lb)    Length of need (1-99 months): 99    Please check all that apply: Patient's condition impairs ambulation.      TRANSFER TUB BENCH FOR HOME USE     Order Specific Question Answer Comments   Type of Transfer Tub Bench: Unpadded    Height: 5' 10" (1.778 m)    Weight: 72.6 kg (160 lb)    Length of need (1-99 months): 99      HIP KIT FOR HOME USE     Order Specific Question Answer Comments   Height: 5' 10" (1.778 m)    Weight: 72.6 kg (160 lb 0.9 oz)    Does patient have medical equipment at home? none    Length of need (1-99 months): 99    Type: [place] Hip Kit      Other restrictions (specify):   Order Comments: Follow post op instructions given in office     Notify your health care provider if you experience any of the following:  temperature >100.4     Notify your health care provider if you experience any of the following:  severe uncontrolled pain     Notify your health care provider if you experience any of the following:  redness, tenderness, or signs of infection (pain, swelling, redness, odor or green/yellow discharge around " incision site)     Change dressing (specify)   Order Comments: Home Health to perform dressing changes     Follow-up Information     Elliott Wynn MD In 4 weeks.    Specialty:  Orthopedic Surgery  Contact information:  [address]  [facility]  [facility] [address] [phone]             [facility].    Specialties:  DME Provider, Home Health Services  Why:  Home Health  Contact information:  [address] [place] [address] [id number] [phone]

## 2020-02-13 ENCOUNTER — OFFICE VISIT (OUTPATIENT)
Dept: OPTOMETRY | Facility: CLINIC | Age: 55
End: 2020-02-13
Payer: MEDICARE

## 2020-02-13 DIAGNOSIS — H52.13 MYOPIA WITH ASTIGMATISM AND PRESBYOPIA, BILATERAL: ICD-10-CM

## 2020-02-13 DIAGNOSIS — H53.9 VISUAL DISTURBANCES: Primary | ICD-10-CM

## 2020-02-13 DIAGNOSIS — H52.203 MYOPIA WITH ASTIGMATISM AND PRESBYOPIA, BILATERAL: ICD-10-CM

## 2020-02-13 DIAGNOSIS — H52.4 MYOPIA WITH ASTIGMATISM AND PRESBYOPIA, BILATERAL: ICD-10-CM

## 2020-02-13 PROCEDURE — 99999 PR PBB SHADOW E&M-EST. PATIENT-LVL II: ICD-10-PCS | Mod: PBBFAC,,, | Performed by: OPTOMETRIST

## 2020-02-13 PROCEDURE — 92014 COMPRE OPH EXAM EST PT 1/>: CPT | Mod: S$PBB,,, | Performed by: OPTOMETRIST

## 2020-02-13 PROCEDURE — 99999 PR PBB SHADOW E&M-EST. PATIENT-LVL II: CPT | Mod: PBBFAC,,, | Performed by: OPTOMETRIST

## 2020-02-13 PROCEDURE — 92015 PR REFRACTION: ICD-10-PCS | Mod: ,,, | Performed by: OPTOMETRIST

## 2020-02-13 PROCEDURE — 99212 OFFICE O/P EST SF 10 MIN: CPT | Mod: PBBFAC,PO | Performed by: OPTOMETRIST

## 2020-02-13 PROCEDURE — 92014 PR EYE EXAM, EST PATIENT,COMPREHESV: ICD-10-PCS | Mod: S$PBB,,, | Performed by: OPTOMETRIST

## 2020-02-13 PROCEDURE — 92015 DETERMINE REFRACTIVE STATE: CPT | Mod: ,,, | Performed by: OPTOMETRIST

## 2020-02-13 NOTE — PROGRESS NOTES
HPI     JOVANNI:02/22/2017  Glasses? Yes   Contacts? no  H/o eye surgery, injections or laser: no  H/o eye injury: no  Known eye conditions? no  Family h/o eye conditions? no  Eye gtts?no    (-) Flashes (-) Floaters (-) Mucous   (-) Tearing (-) Itching (-) Burning   (+) Headaches forehead,time varies  (-) Eye Pain/discomfort (-) Irritation     (-) Redness (-) Double vision (-) Blurry vision    Diabetic? (-)  A1c?  (Hemoglobin A1C       Date                     Value               Ref Range             Status                04/03/2012               5.6                 4.0 - 6.2 %           Final                 03/04/2011               5.9                 4.0 - 6.2 %           Final            ----------)        Last edited by Nichole Frank on 2/13/2020  1:59 PM. (History)            Assessment /Plan     For exam results, see Encounter Report.    Visual disturbances    Myopia with astigmatism and presbyopia, bilateral    SRx released to patient. Patient educated on lens options. Normal ocular health. RTC 1 year for routine exam.

## 2020-12-10 ENCOUNTER — CLINICAL SUPPORT (OUTPATIENT)
Dept: URGENT CARE | Facility: CLINIC | Age: 55
End: 2020-12-10
Payer: MEDICARE

## 2020-12-10 DIAGNOSIS — Z20.822 EXPOSURE TO COVID-19 VIRUS: Primary | ICD-10-CM

## 2020-12-10 LAB
CTP QC/QA: YES
SARS-COV-2 RDRP RESP QL NAA+PROBE: NEGATIVE

## 2020-12-10 PROCEDURE — U0002: ICD-10-PCS | Mod: QW,S$GLB,, | Performed by: EMERGENCY MEDICINE

## 2020-12-10 PROCEDURE — U0002 COVID-19 LAB TEST NON-CDC: HCPCS | Mod: QW,S$GLB,, | Performed by: EMERGENCY MEDICINE

## 2021-10-14 ENCOUNTER — OFFICE VISIT (OUTPATIENT)
Dept: URGENT CARE | Facility: CLINIC | Age: 56
End: 2021-10-14
Payer: MEDICARE

## 2021-10-14 VITALS
HEART RATE: 93 BPM | OXYGEN SATURATION: 95 % | TEMPERATURE: 100 F | BODY MASS INDEX: 24.34 KG/M2 | DIASTOLIC BLOOD PRESSURE: 89 MMHG | WEIGHT: 170 LBS | RESPIRATION RATE: 17 BRPM | HEIGHT: 70 IN | SYSTOLIC BLOOD PRESSURE: 135 MMHG

## 2021-10-14 DIAGNOSIS — R50.9 FEVER, UNSPECIFIED FEVER CAUSE: Primary | ICD-10-CM

## 2021-10-14 LAB
BILIRUB UR QL STRIP: NEGATIVE
CTP QC/QA: YES
GLUCOSE UR QL STRIP: NEGATIVE
KETONES UR QL STRIP: NEGATIVE
LEUKOCYTE ESTERASE UR QL STRIP: NEGATIVE
PH, POC UA: 5.5 (ref 5–8)
POC BLOOD, URINE: NEGATIVE
POC NITRATES, URINE: NEGATIVE
PROT UR QL STRIP: NEGATIVE
SARS-COV-2 RDRP RESP QL NAA+PROBE: NEGATIVE
SP GR UR STRIP: 1.01 (ref 1–1.03)
UROBILINOGEN UR STRIP-ACNC: NORMAL (ref 0.3–2.2)

## 2021-10-14 PROCEDURE — 99203 OFFICE O/P NEW LOW 30 MIN: CPT | Mod: 25,S$GLB,CS, | Performed by: NURSE PRACTITIONER

## 2021-10-14 PROCEDURE — 81003 URINALYSIS AUTO W/O SCOPE: CPT | Mod: QW,S$GLB,, | Performed by: NURSE PRACTITIONER

## 2021-10-14 PROCEDURE — U0002 COVID-19 LAB TEST NON-CDC: HCPCS | Mod: QW,CR,S$GLB, | Performed by: NURSE PRACTITIONER

## 2021-10-14 PROCEDURE — 99203 PR OFFICE/OUTPT VISIT, NEW, LEVL III, 30-44 MIN: ICD-10-PCS | Mod: 25,S$GLB,CS, | Performed by: NURSE PRACTITIONER

## 2021-10-14 PROCEDURE — U0002: ICD-10-PCS | Mod: QW,CR,S$GLB, | Performed by: NURSE PRACTITIONER

## 2021-10-14 PROCEDURE — 81003 POCT URINALYSIS, DIPSTICK, AUTOMATED, W/O SCOPE: ICD-10-PCS | Mod: QW,S$GLB,, | Performed by: NURSE PRACTITIONER

## 2021-10-14 RX ORDER — METHADONE HYDROCHLORIDE 10 MG/1
TABLET ORAL
COMMUNITY
Start: 2020-10-23

## 2021-10-14 RX ORDER — OXYCODONE HYDROCHLORIDE 10 MG/1
TABLET ORAL
COMMUNITY
Start: 2020-12-15

## 2021-10-14 RX ORDER — CLONAZEPAM 1 MG/1
TABLET ORAL
COMMUNITY
Start: 2020-11-10

## 2021-10-14 RX ORDER — TESTOSTERONE CYPIONATE 200 MG/ML
INJECTION, SOLUTION INTRAMUSCULAR
COMMUNITY
Start: 2021-09-20

## 2021-10-14 RX ORDER — IBUPROFEN 800 MG/1
TABLET ORAL
COMMUNITY
Start: 2020-10-23

## 2021-10-14 RX ORDER — METHADONE HYDROCHLORIDE 10 MG/1
TABLET ORAL
COMMUNITY
Start: 2020-12-15

## 2021-10-14 RX ORDER — ONDANSETRON 4 MG/1
TABLET, FILM COATED ORAL
COMMUNITY
Start: 2020-10-23

## 2021-10-14 RX ORDER — VALACYCLOVIR HYDROCHLORIDE 1 G/1
TABLET, FILM COATED ORAL
COMMUNITY
Start: 2020-11-10

## 2021-10-14 RX ORDER — ACETAMINOPHEN 325 MG/1
650 TABLET ORAL
Status: COMPLETED | OUTPATIENT
Start: 2021-10-14 | End: 2021-10-14

## 2021-10-14 RX ORDER — LIDOCAINE 50 MG/G
PATCH TOPICAL
COMMUNITY
Start: 2020-10-23

## 2021-10-14 RX ORDER — METHADONE HYDROCHLORIDE 10 MG/ML
30 CONCENTRATE ORAL
COMMUNITY

## 2021-10-14 RX ORDER — GABAPENTIN 300 MG/1
CAPSULE ORAL
COMMUNITY
Start: 2021-07-29

## 2021-10-14 RX ORDER — TESTOSTERONE CYPIONATE 200 MG/ML
INJECTION, SOLUTION INTRAMUSCULAR
COMMUNITY
Start: 2020-11-23

## 2021-10-14 RX ADMIN — ACETAMINOPHEN 650 MG: 325 TABLET ORAL at 07:10

## 2021-12-07 RX ORDER — PROMETHAZINE HYDROCHLORIDE 12.5 MG/1
12.5 TABLET ORAL
Status: SHIPPED | OUTPATIENT
Start: 2021-12-07

## 2022-01-01 NOTE — PRE ADMISSION SCREENING
Testing sent to dr morel and ad[facility]   _ infant born at _ weeks to a _ year old G_P_ mother via _. Maternal history non-pertinent. Pregnancy course uncomplicated.  Maternal blood type _. GBS negative, HBsAg negative, HIV negative; treponema non-reactive & Rubella immune. COVID-19 swab negative.     Delivery uncomplicated. APGAR 9 & 9 at 1 & 5 minutes respectively. Birth weight _ g. Erythromycin eye drops and vitamin K given; hepatitis B vaccine given. Infant blood type _, Leatha negative.    Head Circumference (cm): 35 (13 Aug 2022 17:00)    Glucose: CAPILLARY BLOOD GLUCOSE      POCT Blood Glucose.: 80 mg/dL (13 Aug 2022 15:05)  POCT Blood Glucose.: 63 mg/dL (13 Aug 2022 14:03)  POCT Blood Glucose.: 68 mg/dL (13 Aug 2022 12:51)    Vital Signs Last 24 Hrs  T(C): 36.6 (13 Aug 2022 17:00), Max: 37 (13 Aug 2022 13:50)  T(F): 97.8 (13 Aug 2022 17:00), Max: 98.6 (13 Aug 2022 13:50)  HR: 150 (13 Aug 2022 17:00) (136 - 152)  BP: --  BP(mean): --  RR: 52 (13 Aug 2022 17:00) (44 - 52)  SpO2: --    Parameters below as of 13 Aug 2022 16:20  Patient On (Oxygen Delivery Method): room air        Physical Exam  General: no acute distress, well appearing  Head: anterior fontanel open and flat  Eyes: Globes present b/l; no scleral icterus  Ears/Nose: patent w/ no deformities  Mouth/Throat: no cleft lip or palate   Neck: no masses or lesion, no clavicular crepitus  Cardiovascular: S1 & S2, no significant murmurs, femoral pulses 2+ B/L  Respiratory: Lungs clear to auscultation bilaterally, no wheezing, rales or rhonchi; no retractions  Abdomen: soft, non-distended, BS +, no masses, no organomegaly, umbilical cord stump attached  Genitourinary: normal joel 1 external genitalia  Anus: patent   Back: no significant sacral dimple or tags  Musculoskeletal: moving all extremities, Ortolani/Millan negative  Skin: no significant lesions, no significant jaundice  Neurological: reactive; suck, grasp, nisreen & Babinski reflexes + M infant born at 38.5 weeks to a 41 year old  mother via repeat C/S. Maternal history non-pertinent. Pregnancy course uncomplicated.  Maternal blood type A+. GBS negative, HBsAg negative, HIV negative; treponema non-reactive & Rubella immune. COVID-19 swab negative.     Delivery uncomplicated. Nuchal cord x 1. APGAR 9 & 9 at 1 & 5 minutes respectively. Birth weight 3620 g (LGA). Erythromycin eye drops and vitamin K given; hepatitis B vaccine given.     Head Circumference (cm): 35 (13 Aug 2022 17:00)    Glucose: CAPILLARY BLOOD GLUCOSE  POCT Blood Glucose.: 80 mg/dL (13 Aug 2022 15:05)  POCT Blood Glucose.: 63 mg/dL (13 Aug 2022 14:03)  POCT Blood Glucose.: 68 mg/dL (13 Aug 2022 12:51)    Vital Signs Last 24 Hrs  T(C): 36.6 (13 Aug 2022 17:00), Max: 37 (13 Aug 2022 13:50)  T(F): 97.8 (13 Aug 2022 17:00), Max: 98.6 (13 Aug 2022 13:50)  HR: 150 (13 Aug 2022 17:00) (136 - 152)  BP: --  BP(mean): --  RR: 52 (13 Aug 2022 17:00) (44 - 52)  SpO2: --    Parameters below as of 13 Aug 2022 16:20  Patient On (Oxygen Delivery Method): room air    Physical Exam  General: no acute distress, well appearing  Head: anterior fontanel open and flat  Eyes: Globes present b/l; no scleral icterus; +red reflex bilaterally   Ears/Nose: patent w/ no deformities  Mouth/Throat: no cleft lip or palate; +tight tongue  Neck: no masses or lesion, no clavicular crepitus  Cardiovascular: S1 & S2, no significant murmurs, femoral pulses 2+ B/L  Respiratory: Lungs clear to auscultation bilaterally, no wheezing, rales or rhonchi; no retractions  Abdomen: soft, non-distended, BS +, no masses, no organomegaly, umbilical cord stump attached  Genitourinary: normal joel 1 external genitalia  Anus: patent   Back: no significant sacral dimple or tags  Musculoskeletal: moving all extremities, Ortolani/Millan negative  Skin: no significant lesions, no significant jaundice  Neurological: reactive; suck, grasp, nisreen & Babinski reflexes +

## 2022-06-10 ENCOUNTER — LAB VISIT (OUTPATIENT)
Dept: LAB | Facility: HOSPITAL | Age: 57
End: 2022-06-10
Attending: INTERNAL MEDICINE
Payer: MEDICARE

## 2022-06-10 DIAGNOSIS — K92.2 GASTROINTESTINAL HEMORRHAGE, UNSPECIFIED GASTROINTESTINAL HEMORRHAGE TYPE: ICD-10-CM

## 2022-06-10 DIAGNOSIS — R63.4 WEIGHT LOSS: ICD-10-CM

## 2022-06-10 LAB
ALBUMIN SERPL BCP-MCNC: 3.8 G/DL (ref 3.5–5.2)
ALP SERPL-CCNC: 60 U/L (ref 55–135)
ALT SERPL W/O P-5'-P-CCNC: 11 U/L (ref 10–44)
ANION GAP SERPL CALC-SCNC: 10 MMOL/L (ref 8–16)
AST SERPL-CCNC: 16 U/L (ref 10–40)
BASOPHILS # BLD AUTO: 0.05 K/UL (ref 0–0.2)
BASOPHILS NFR BLD: 0.9 % (ref 0–1.9)
BILIRUB SERPL-MCNC: 0.2 MG/DL (ref 0.1–1)
BUN SERPL-MCNC: 12 MG/DL (ref 6–20)
CALCIUM SERPL-MCNC: 8.7 MG/DL (ref 8.7–10.5)
CHLORIDE SERPL-SCNC: 104 MMOL/L (ref 95–110)
CO2 SERPL-SCNC: 24 MMOL/L (ref 23–29)
CORTIS SERPL-MCNC: 9 UG/DL
CREAT SERPL-MCNC: 0.8 MG/DL (ref 0.5–1.4)
DIFFERENTIAL METHOD: ABNORMAL
EOSINOPHIL # BLD AUTO: 0.2 K/UL (ref 0–0.5)
EOSINOPHIL NFR BLD: 2.8 % (ref 0–8)
ERYTHROCYTE [DISTWIDTH] IN BLOOD BY AUTOMATED COUNT: 13.8 % (ref 11.5–14.5)
ERYTHROCYTE [SEDIMENTATION RATE] IN BLOOD BY WESTERGREN METHOD: 6 MM/HR (ref 0–23)
EST. GFR  (AFRICAN AMERICAN): >60 ML/MIN/1.73 M^2
EST. GFR  (NON AFRICAN AMERICAN): >60 ML/MIN/1.73 M^2
FERRITIN SERPL-MCNC: 37 NG/ML (ref 20–300)
GLUCOSE SERPL-MCNC: 93 MG/DL (ref 70–110)
HCT VFR BLD AUTO: 47 % (ref 40–54)
HGB BLD-MCNC: 15.8 G/DL (ref 14–18)
IMM GRANULOCYTES # BLD AUTO: 0.02 K/UL (ref 0–0.04)
IMM GRANULOCYTES NFR BLD AUTO: 0.4 % (ref 0–0.5)
IRON SERPL-MCNC: 44 UG/DL (ref 45–160)
LYMPHOCYTES # BLD AUTO: 2.3 K/UL (ref 1–4.8)
LYMPHOCYTES NFR BLD: 42.1 % (ref 18–48)
MAGNESIUM SERPL-MCNC: 1.9 MG/DL (ref 1.6–2.6)
MCH RBC QN AUTO: 34 PG (ref 27–31)
MCHC RBC AUTO-ENTMCNC: 33.6 G/DL (ref 32–36)
MCV RBC AUTO: 101 FL (ref 82–98)
MONOCYTES # BLD AUTO: 0.5 K/UL (ref 0.3–1)
MONOCYTES NFR BLD: 10.1 % (ref 4–15)
NEUTROPHILS # BLD AUTO: 2.4 K/UL (ref 1.8–7.7)
NEUTROPHILS NFR BLD: 43.7 % (ref 38–73)
NRBC BLD-RTO: 0 /100 WBC
PLATELET # BLD AUTO: 152 K/UL (ref 150–450)
PMV BLD AUTO: 11 FL (ref 9.2–12.9)
POTASSIUM SERPL-SCNC: 3.6 MMOL/L (ref 3.5–5.1)
PROT SERPL-MCNC: 6.6 G/DL (ref 6–8.4)
RBC # BLD AUTO: 4.65 M/UL (ref 4.6–6.2)
SATURATED IRON: 13 % (ref 20–50)
SODIUM SERPL-SCNC: 138 MMOL/L (ref 136–145)
TOTAL IRON BINDING CAPACITY: 337 UG/DL (ref 250–450)
TRANSFERRIN SERPL-MCNC: 228 MG/DL (ref 200–375)
TSH SERPL DL<=0.005 MIU/L-ACNC: 1.73 UIU/ML (ref 0.4–4)
WBC # BLD AUTO: 5.37 K/UL (ref 3.9–12.7)

## 2022-06-10 PROCEDURE — 84466 ASSAY OF TRANSFERRIN: CPT | Performed by: INTERNAL MEDICINE

## 2022-06-10 PROCEDURE — 83735 ASSAY OF MAGNESIUM: CPT | Performed by: INTERNAL MEDICINE

## 2022-06-10 PROCEDURE — 82533 TOTAL CORTISOL: CPT | Performed by: INTERNAL MEDICINE

## 2022-06-10 PROCEDURE — 82728 ASSAY OF FERRITIN: CPT | Performed by: INTERNAL MEDICINE

## 2022-06-10 PROCEDURE — 85652 RBC SED RATE AUTOMATED: CPT | Performed by: INTERNAL MEDICINE

## 2022-06-10 PROCEDURE — 36415 COLL VENOUS BLD VENIPUNCTURE: CPT | Performed by: INTERNAL MEDICINE

## 2022-06-10 PROCEDURE — 84443 ASSAY THYROID STIM HORMONE: CPT | Performed by: INTERNAL MEDICINE

## 2022-06-10 PROCEDURE — 80053 COMPREHEN METABOLIC PANEL: CPT | Performed by: INTERNAL MEDICINE

## 2022-06-10 PROCEDURE — 85025 COMPLETE CBC W/AUTO DIFF WBC: CPT | Performed by: INTERNAL MEDICINE

## 2022-11-14 ENCOUNTER — TELEPHONE (OUTPATIENT)
Dept: ENDOSCOPY | Facility: HOSPITAL | Age: 57
End: 2022-11-14
Payer: MEDICARE

## 2022-11-14 ENCOUNTER — OFFICE VISIT (OUTPATIENT)
Dept: GASTROENTEROLOGY | Facility: CLINIC | Age: 57
End: 2022-11-14
Payer: MEDICARE

## 2022-11-14 VITALS
DIASTOLIC BLOOD PRESSURE: 88 MMHG | HEART RATE: 80 BPM | BODY MASS INDEX: 23.48 KG/M2 | HEIGHT: 70 IN | SYSTOLIC BLOOD PRESSURE: 137 MMHG | WEIGHT: 164 LBS

## 2022-11-14 VITALS — HEIGHT: 70 IN | WEIGHT: 164 LBS | BODY MASS INDEX: 23.48 KG/M2

## 2022-11-14 DIAGNOSIS — Z12.11 ENCOUNTER FOR SCREENING COLONOSCOPY: ICD-10-CM

## 2022-11-14 DIAGNOSIS — K31.A0 GASTRIC INTESTINAL METAPLASIA: ICD-10-CM

## 2022-11-14 DIAGNOSIS — Z12.11 SPECIAL SCREENING FOR MALIGNANT NEOPLASMS, COLON: Primary | ICD-10-CM

## 2022-11-14 DIAGNOSIS — K31.A0 GASTRIC INTESTINAL METAPLASIA: Primary | ICD-10-CM

## 2022-11-14 PROCEDURE — 99215 OFFICE O/P EST HI 40 MIN: CPT | Mod: PBBFAC | Performed by: INTERNAL MEDICINE

## 2022-11-14 PROCEDURE — 99203 OFFICE O/P NEW LOW 30 MIN: CPT | Mod: S$PBB,,, | Performed by: INTERNAL MEDICINE

## 2022-11-14 PROCEDURE — 99999 PR PBB SHADOW E&M-EST. PATIENT-LVL V: ICD-10-PCS | Mod: PBBFAC,,, | Performed by: INTERNAL MEDICINE

## 2022-11-14 PROCEDURE — 99999 PR PBB SHADOW E&M-EST. PATIENT-LVL V: CPT | Mod: PBBFAC,,, | Performed by: INTERNAL MEDICINE

## 2022-11-14 PROCEDURE — 99203 PR OFFICE/OUTPT VISIT, NEW, LEVL III, 30-44 MIN: ICD-10-PCS | Mod: S$PBB,,, | Performed by: INTERNAL MEDICINE

## 2022-11-14 RX ORDER — SODIUM, POTASSIUM,MAG SULFATES 17.5-3.13G
1 SOLUTION, RECONSTITUTED, ORAL ORAL DAILY
Qty: 1 KIT | Refills: 0 | Status: SHIPPED | OUTPATIENT
Start: 2022-11-14 | End: 2022-11-16

## 2022-11-14 NOTE — PROGRESS NOTES
[facility] Consultation Note    Reason for Consult:  The primary encounter diagnosis was Gastric intestinal metaplasia. A diagnosis of Encounter for screening colonoscopy was also pertinent to this visit.    PCP:   Morgan Bauer   No address on file    Referring MD:  Aaareferral Self  No address on file    Initial History of Present Illness (HPI):  This is a 57 y.o. male here for evaluation of follow-up EGD for gastric intestinal metaplasia and screening colonoscopy.  Patient says he would like his procedures done as soon as possible his last EGD was by Dr. Seven Johnson showed gastric intestinal metaplasia no dysplasia no H pylori he says his primary care doctor would like him to have a follow-up EGD he has never had a screening colonoscopy you would like to have that he is on medicines that can make patient's constipated but he says he has a bowel movement every day he does not think he needs the constipation bowel prep we did encourage it but he does not think he needs it we do recommend he start MiraLax 17 g in 12 oz water once daily at least 5 days prior to colonoscopy to ensure that he is cleaned out with a split bowel prep patient states he has not adopted no family history of any GI malignancies no esophageal or stomach cancer no liver cancer no pancreatitis no pancreatic cancer no colon cancer no small-bowel cancer no advanced colon adenomas polyps in the family no ovarian uterine cancer no kidney or bladder cancer no FAP no attenuated FAP no MA P no Shelton syndrome nobody with celiac sprue or inflammatory bowel disease no GERD no dysphagia no odynophagia no weight loss no recent change in bowel.    Abdominal pain - no  Reflux - no  Dysphagia - no   Bowel habits - normal  GI bleeding - none  NSAID usage - none    Interval HPI 11/14/2022:  The patient's last visit with me was on Visit date not found.      ROS:  Constitutional: No fevers, chills, No weight loss  ENT:  No heartburn no  dysphagia no odynophagia no hoarseness  CV: No chest pain, no palpitation  Pulm: No cough, No shortness of breath, no wheezing  Ophtho: No vision changes  GI: see HPI  Derm: No rash, no itching  Heme: No lymphadenopathy  MSK:  Chronic musculoskeletal pain  : No dysuria, No hematuria  Endo: No hot or cold intolerance  Neuro: No syncope, No seizure, no strokes  Psych: No uncontrolled anxiety, No uncontrolled depression    Medical History:  has a past medical history of Anxiety, Arthritis, Back pain, Cervical disc disorder with radiculopathy, Encounter for blood transfusion, Fever blister, Hepatitis C, History of acne, Joint pain, and Kidney stones.    Surgical History:  has a past surgical history that includes Spine surgery; Anterior cervical corpectomy w/ fusion; Appendectomy; and Hip Arthroplasty (Right, 12/9/2019).    Family History: family history includes Cancer in his father; Diabetes in his father..     Social History:  reports that he quit smoking about 18 years ago. His smoking use included cigarettes. He has never used smokeless tobacco. He reports current drug use. Drug: Marijuana. He reports that he does not drink alcohol.    Review of patient's allergies indicates:   Allergen Reactions    Dilantin [phenytoin sodium extended] Hives       Medication List with Changes/Refills   Current Medications    ACETAMINOPHEN (TYLENOL) 500 MG TABLET    Take 1,000 mg by mouth every 6 (six) hours as needed for Pain.    ASPIRIN (ECOTRIN) 325 MG EC TABLET    Take 1 tablet (325 mg total) by mouth 2 (two) times daily. For 4 weeks post op    CLONAZEPAM (KLONOPIN) 0.5 MG TABLET    Take 1 tablet (0.5 mg total) by mouth 3 (three) times daily.    CLONAZEPAM (KLONOPIN) 1 MG TABLET        GABAPENTIN (NEURONTIN) 300 MG CAPSULE        IBUPROFEN (ADVIL,MOTRIN) 800 MG TABLET        LIDOCAINE (LIDODERM) 5 %        METHADONE (DOLOPHINE) 10 MG TABLET        METHADONE (DOLOPHINE) 10 MG TABLET        METHADONE (DOLOPHINE) 10 MG/ML  "SOLUTION    Take 30 mg by mouth 3 (three) times daily.     METHADONE (DOLOPHINE) 10 MG/ML SOLUTION    Take 30 mg by mouth.    ONDANSETRON (ZOFRAN) 4 MG TABLET        OXYCODONE (OXY-IR) 5 MG CAP    Take 10 mg by mouth every 4 (four) hours as needed for Pain.     OXYCODONE (OXYCONTIN) 10 MG 12 HR TABLET    Take 10 mg by mouth 4 (four) times daily.    OXYCODONE (ROXICODONE) 10 MG TAB IMMEDIATE RELEASE TABLET        RANITIDINE HCL (ZANTAC ORAL)    Take by mouth.    TESTOSTERONE CYPIONATE (DEPOTESTOTERONE CYPIONATE) 100 MG/ML INJECTION    Inject 160 mg into the muscle once a week.    TESTOSTERONE CYPIONATE (DEPOTESTOTERONE CYPIONATE) 200 MG/ML INJECTION        TESTOSTERONE CYPIONATE (DEPOTESTOTERONE CYPIONATE) 200 MG/ML INJECTION        VALACYCLOVIR (VALTREX) 1000 MG TABLET             Objective Findings:    Vital Signs:  /88 (BP Location: Left arm, Patient Position: Sitting, BP Method: Medium (Automatic))   Pulse 80   Ht 5' 10" (1.778 m)   Wt 74.4 kg (164 lb 0.4 oz)   BMI 23.53 kg/m²   Body mass index is 23.53 kg/m².    Physical Exam:  General Appearance: Well appearing in no acute distress  Eyes:    No scleral icterus  ENT:  No lesions or masses   Lungs: CTA bilaterally, no wheezes, no rhonchi, no rales  Heart:  S1, S2 normal, no murmurs heard  Abdomen:  Non distended, soft, no guarding, no rebound, no tenderness, no appreciated ascites, no bruits, no hepatosplenomegaly,  No CVA tenderness, no appreciated hernias, no Hogan sign, no McBurney point tenderness  Musculoskeletal:  No major joint deformities  Skin: No petechiae or rash on exposed skin areas  Neurologic:  Alert and oriented x4  Psychiatric:  Normal speech mentation and affect    Labs:  Lab Results   Component Value Date    WBC 5.37 06/10/2022    HGB 15.8 06/10/2022    HCT 47.0 06/10/2022     06/10/2022    CHOL 145 03/17/2015    TRIG 74 03/17/2015    HDL 32 (L) 03/17/2015    ALT 11 06/10/2022    AST 16 06/10/2022     06/10/2022    K 3.6 " 06/10/2022     06/10/2022    CREATININE 0.8 06/10/2022    BUN 12 06/10/2022    CO2 24 06/10/2022    TSH 1.729 06/10/2022    PSA 0.19 04/03/2012    HGBA1C 5.6 12/03/2020             Medical Decision Making:  EGD colonoscopy talk given  Constipation bowel prep talk given patient does not think he is constipated has a bowel movement every day  Lab work reviewed  Patient request EGD colonoscopy soon as possible    Assessment:  1. Gastric intestinal metaplasia    2. Encounter for screening colonoscopy         Recommendations:  1. EGD for follow-up evaluation of gastric intestinal metaplasia last scoped by Dr. Johnson and screening colonoscopy patient states he is not constipated but may be smart today do MiraLax 17 g in 12 oz of water once daily starting 5 days prior to colonoscopy with split bowel prep.  2. Return to GI clinic as needed    No follow-ups on file.      Order summary:  Orders Placed This Encounter    Ambulatory referral/consult to Endo Procedure          Thank you so much for allowing me to participate in the care of Blaise Medina MD

## 2022-11-14 NOTE — TELEPHONE ENCOUNTER
Spoke with patient. EGD/Colonoscopy scheduled. Instructions reviewed and verbalized. Patient requests Dr. Medina's first availability. 12/1/22 with an arrival time of 2:30 pm confirmed. Pt verbalized understanding. Instructions mailed per patient's request.

## 2022-11-30 ENCOUNTER — TELEPHONE (OUTPATIENT)
Dept: ENDOSCOPY | Facility: HOSPITAL | Age: 57
End: 2022-11-30
Payer: MEDICARE

## 2022-11-30 DIAGNOSIS — K31.A0 GASTRIC INTESTINAL METAPLASIA: Primary | ICD-10-CM

## 2022-11-30 DIAGNOSIS — Z12.11 ENCOUNTER FOR SCREENING COLONOSCOPY: ICD-10-CM

## 2022-11-30 NOTE — TELEPHONE ENCOUNTER
Ma spoke with pt   Pt states he has the flu and needs to rescheduled   Pt called back after he was removed form the schedule and states he wants to keep procedure   Pt was informed if hes sick he needs to rescheduled   Pt thanked MA

## 2022-12-12 ENCOUNTER — TELEPHONE (OUTPATIENT)
Dept: ENDOSCOPY | Facility: HOSPITAL | Age: 57
End: 2022-12-12
Payer: MEDICARE

## 2022-12-12 NOTE — TELEPHONE ENCOUNTER
----- Message from Hoa Lemos sent at 12/12/2022 12:31 PM CST -----  Contact: @[phone]  Pt is calling in to reschedule his colonoscopy and endoscopy, please call to discuss further.

## 2023-01-03 ENCOUNTER — ANESTHESIA (OUTPATIENT)
Dept: ENDOSCOPY | Facility: HOSPITAL | Age: 58
End: 2023-01-03
Payer: MEDICARE

## 2023-01-03 ENCOUNTER — ANESTHESIA EVENT (OUTPATIENT)
Dept: ENDOSCOPY | Facility: HOSPITAL | Age: 58
End: 2023-01-03
Payer: MEDICARE

## 2023-01-03 ENCOUNTER — TELEPHONE (OUTPATIENT)
Dept: ENDOSCOPY | Facility: HOSPITAL | Age: 58
End: 2023-01-03
Payer: MEDICARE

## 2023-01-03 NOTE — ANESTHESIA PREPROCEDURE EVALUATION
01/03/2023  Blaise Louis is a 57 y.o., male.      Pre-op Assessment    I have reviewed the Patient Summary Reports.     I have reviewed the Nursing Notes. I have reviewed the NPO Status.   I have reviewed the Medications.     Review of Systems  Anesthesia Hx:  No problems with previous Anesthesia  History of prior surgery of interest to airway management or planning: Denies Family Hx of Anesthesia complications.   Denies Personal Hx of Anesthesia complications.   Hematology/Oncology:  Hematology Normal   Oncology Normal     EENT/Dental:EENT/Dental Normal   Cardiovascular:  Cardiovascular Normal Exercise tolerance: good  ECG has been reviewed.    Pulmonary:  Pulmonary Normal    Renal/:   renal calculi    Hepatic/GI:   Liver Disease, Hepatitis, C    Musculoskeletal:  Musculoskeletal Normal    Neurological:   Chronic Pain Syndrome   Endocrine:  Endocrine Normal    Dermatological:  Skin Normal    Psych:   anxiety          Physical Exam  General: Well nourished, Cooperative, Alert and Oriented    Airway:  Mallampati: II   Mouth Opening: Normal  TM Distance: Normal  Tongue: Normal  Neck ROM: Normal ROM    Dental:  Intact        Anesthesia Plan  Type of Anesthesia, risks & benefits discussed:    Anesthesia Type: Gen Natural Airway  Intra-op Monitoring Plan: Standard ASA Monitors  Induction:  IV  Informed Consent: Informed consent signed with the Patient and all parties understand the risks and agree with anesthesia plan.  All questions answered.   ASA Score: 2  Day of Surgery Review of History & Physical: H&P Update referred to the surgeon/provider.    Ready For Surgery From Anesthesia Perspective.     .

## 2023-01-04 DIAGNOSIS — K31.A0 GASTRIC INTESTINAL METAPLASIA: Primary | ICD-10-CM

## 2023-01-04 DIAGNOSIS — Z12.11 ENCOUNTER FOR SCREENING COLONOSCOPY: ICD-10-CM

## 2023-02-22 ENCOUNTER — PATIENT MESSAGE (OUTPATIENT)
Dept: GASTROENTEROLOGY | Facility: CLINIC | Age: 58
End: 2023-02-22
Payer: MEDICARE

## 2023-03-28 ENCOUNTER — PATIENT MESSAGE (OUTPATIENT)
Dept: GASTROENTEROLOGY | Facility: CLINIC | Age: 58
End: 2023-03-28
Payer: MEDICARE

## 2023-04-15 ENCOUNTER — TELEPHONE (OUTPATIENT)
Dept: ENDOSCOPY | Facility: HOSPITAL | Age: 58
End: 2023-04-15
Payer: MEDICARE

## 2023-04-15 NOTE — TELEPHONE ENCOUNTER
Called pt to reschedule EGD & Colonoscopy.  Pt states that he will call back when he can get with his ride.  Provided with Endoscopy Scheduling #.  
,

## 2023-05-11 ENCOUNTER — PATIENT MESSAGE (OUTPATIENT)
Dept: GASTROENTEROLOGY | Facility: CLINIC | Age: 58
End: 2023-05-11
Payer: MEDICARE

## 2023-05-23 DIAGNOSIS — Z12.11 SPECIAL SCREENING FOR MALIGNANT NEOPLASMS, COLON: Primary | ICD-10-CM

## 2023-05-23 RX ORDER — POLYETHYLENE GLYCOL 3350, SODIUM SULFATE ANHYDROUS, SODIUM BICARBONATE, SODIUM CHLORIDE, POTASSIUM CHLORIDE 236; 22.74; 6.74; 5.86; 2.97 G/4L; G/4L; G/4L; G/4L; G/4L
4 POWDER, FOR SOLUTION ORAL ONCE
Qty: 4000 ML | Refills: 0 | Status: SHIPPED | OUTPATIENT
Start: 2023-05-23 | End: 2023-05-23

## 2023-05-31 ENCOUNTER — TELEPHONE (OUTPATIENT)
Dept: ENDOSCOPY | Facility: HOSPITAL | Age: 58
End: 2023-05-31
Payer: MEDICARE

## 2023-05-31 NOTE — TELEPHONE ENCOUNTER
Returned patient call that was left on voicemail spoke with patient Patient kati he spoke with someone

## 2023-06-01 ENCOUNTER — PATIENT MESSAGE (OUTPATIENT)
Dept: ENDOSCOPY | Facility: HOSPITAL | Age: 58
End: 2023-06-01

## 2023-06-01 ENCOUNTER — HOSPITAL ENCOUNTER (OUTPATIENT)
Facility: HOSPITAL | Age: 58
Discharge: HOME | End: 2023-06-01
Attending: INTERNAL MEDICINE | Admitting: INTERNAL MEDICINE
Payer: MEDICARE

## 2023-06-01 VITALS
WEIGHT: 142 LBS | HEART RATE: 70 BPM | HEIGHT: 70 IN | TEMPERATURE: 98 F | DIASTOLIC BLOOD PRESSURE: 73 MMHG | RESPIRATION RATE: 18 BRPM | BODY MASS INDEX: 20.33 KG/M2 | OXYGEN SATURATION: 100 % | SYSTOLIC BLOOD PRESSURE: 128 MMHG

## 2023-06-01 DIAGNOSIS — Z12.11 SCREENING FOR COLON CANCER: ICD-10-CM

## 2023-06-01 PROCEDURE — G0121 COLON CA SCRN NOT HI RSK IND: HCPCS | Performed by: INTERNAL MEDICINE

## 2023-06-01 PROCEDURE — 27201012 HC FORCEPS, HOT/COLD, DISP: Performed by: INTERNAL MEDICINE

## 2023-06-01 PROCEDURE — 63600175 PHARM REV CODE 636 W HCPCS: Performed by: NURSE ANESTHETIST, CERTIFIED REGISTERED

## 2023-06-01 PROCEDURE — 25000003 PHARM REV CODE 250: Performed by: NURSE ANESTHETIST, CERTIFIED REGISTERED

## 2023-06-01 PROCEDURE — 88305 TISSUE EXAM BY PATHOLOGIST: CPT | Performed by: STUDENT IN AN ORGANIZED HEALTH CARE EDUCATION/TRAINING PROGRAM

## 2023-06-01 PROCEDURE — 37000009 HC ANESTHESIA EA ADD 15 MINS: Performed by: INTERNAL MEDICINE

## 2023-06-01 PROCEDURE — 43239 PR EGD, FLEX, W/BIOPSY, SGL/MULTI: ICD-10-PCS | Mod: 51,,, | Performed by: INTERNAL MEDICINE

## 2023-06-01 PROCEDURE — 88341 IMHCHEM/IMCYTCHM EA ADD ANTB: CPT | Performed by: STUDENT IN AN ORGANIZED HEALTH CARE EDUCATION/TRAINING PROGRAM

## 2023-06-01 PROCEDURE — E9220 PRA ENDO ANESTHESIA: HCPCS | Mod: ,,, | Performed by: NURSE ANESTHETIST, CERTIFIED REGISTERED

## 2023-06-01 PROCEDURE — 88342 IMHCHEM/IMCYTCHM 1ST ANTB: CPT | Performed by: STUDENT IN AN ORGANIZED HEALTH CARE EDUCATION/TRAINING PROGRAM

## 2023-06-01 PROCEDURE — 88341 IMHCHEM/IMCYTCHM EA ADD ANTB: CPT | Mod: 26,,, | Performed by: STUDENT IN AN ORGANIZED HEALTH CARE EDUCATION/TRAINING PROGRAM

## 2023-06-01 PROCEDURE — E9220 PRA ENDO ANESTHESIA: ICD-10-PCS | Mod: ,,, | Performed by: NURSE ANESTHETIST, CERTIFIED REGISTERED

## 2023-06-01 PROCEDURE — 43239 EGD BIOPSY SINGLE/MULTIPLE: CPT | Mod: 51,,, | Performed by: INTERNAL MEDICINE

## 2023-06-01 PROCEDURE — 88342 IMHCHEM/IMCYTCHM 1ST ANTB: CPT | Mod: 26,,, | Performed by: STUDENT IN AN ORGANIZED HEALTH CARE EDUCATION/TRAINING PROGRAM

## 2023-06-01 PROCEDURE — G0121 COLON CA SCRN NOT HI RSK IND: ICD-10-PCS | Mod: ,,, | Performed by: INTERNAL MEDICINE

## 2023-06-01 PROCEDURE — 88341 PR IHC OR ICC EACH ADD'L SINGLE ANTIBODY  STAINPR: ICD-10-PCS | Mod: 26,,, | Performed by: STUDENT IN AN ORGANIZED HEALTH CARE EDUCATION/TRAINING PROGRAM

## 2023-06-01 PROCEDURE — 88305 TISSUE EXAM BY PATHOLOGIST: ICD-10-PCS | Mod: 26,,, | Performed by: STUDENT IN AN ORGANIZED HEALTH CARE EDUCATION/TRAINING PROGRAM

## 2023-06-01 PROCEDURE — G0121 COLON CA SCRN NOT HI RSK IND: HCPCS | Mod: ,,, | Performed by: INTERNAL MEDICINE

## 2023-06-01 PROCEDURE — 88342 CHG IMMUNOCYTOCHEMISTRY: ICD-10-PCS | Mod: 26,,, | Performed by: STUDENT IN AN ORGANIZED HEALTH CARE EDUCATION/TRAINING PROGRAM

## 2023-06-01 PROCEDURE — 37000008 HC ANESTHESIA 1ST 15 MINUTES: Performed by: INTERNAL MEDICINE

## 2023-06-01 PROCEDURE — 43239 EGD BIOPSY SINGLE/MULTIPLE: CPT | Performed by: INTERNAL MEDICINE

## 2023-06-01 PROCEDURE — 88305 TISSUE EXAM BY PATHOLOGIST: CPT | Mod: 26,,, | Performed by: STUDENT IN AN ORGANIZED HEALTH CARE EDUCATION/TRAINING PROGRAM

## 2023-06-01 RX ORDER — FENTANYL CITRATE 50 UG/ML
INJECTION, SOLUTION INTRAMUSCULAR; INTRAVENOUS
Status: DISCONTINUED | OUTPATIENT
Start: 2023-06-01 | End: 2023-06-01

## 2023-06-01 RX ORDER — LIDOCAINE HYDROCHLORIDE 20 MG/ML
INJECTION, SOLUTION EPIDURAL; INFILTRATION; INTRACAUDAL; PERINEURAL
Status: DISCONTINUED | OUTPATIENT
Start: 2023-06-01 | End: 2023-06-01

## 2023-06-01 RX ORDER — PROPOFOL 10 MG/ML
VIAL (ML) INTRAVENOUS CONTINUOUS PRN
Status: DISCONTINUED | OUTPATIENT
Start: 2023-06-01 | End: 2023-06-01

## 2023-06-01 RX ORDER — ONDANSETRON 2 MG/ML
INJECTION INTRAMUSCULAR; INTRAVENOUS
Status: DISCONTINUED | OUTPATIENT
Start: 2023-06-01 | End: 2023-06-01

## 2023-06-01 RX ORDER — PHENYLEPHRINE HYDROCHLORIDE 10 MG/ML
INJECTION INTRAVENOUS
Status: DISCONTINUED | OUTPATIENT
Start: 2023-06-01 | End: 2023-06-01

## 2023-06-01 RX ORDER — PROPOFOL 10 MG/ML
VIAL (ML) INTRAVENOUS
Status: DISCONTINUED | OUTPATIENT
Start: 2023-06-01 | End: 2023-06-01

## 2023-06-01 RX ORDER — SODIUM CHLORIDE 9 MG/ML
INJECTION, SOLUTION INTRAVENOUS CONTINUOUS
Status: DISCONTINUED | OUTPATIENT
Start: 2023-06-01 | End: 2023-06-01 | Stop reason: HOSPADM

## 2023-06-01 RX ADMIN — LIDOCAINE HYDROCHLORIDE 100 MG: 20 INJECTION, SOLUTION EPIDURAL; INFILTRATION; INTRACAUDAL; PERINEURAL at 03:06

## 2023-06-01 RX ADMIN — ONDANSETRON 4 MG: 2 INJECTION INTRAMUSCULAR; INTRAVENOUS at 03:06

## 2023-06-01 RX ADMIN — FENTANYL CITRATE 50 MCG: 50 INJECTION, SOLUTION INTRAMUSCULAR; INTRAVENOUS at 03:06

## 2023-06-01 RX ADMIN — PHENYLEPHRINE HYDROCHLORIDE 100 MCG: 10 INJECTION INTRAVENOUS at 04:06

## 2023-06-01 RX ADMIN — PHENYLEPHRINE HYDROCHLORIDE 100 MCG: 10 INJECTION INTRAVENOUS at 03:06

## 2023-06-01 RX ADMIN — PROPOFOL 100 MG: 10 INJECTION, EMULSION INTRAVENOUS at 03:06

## 2023-06-01 RX ADMIN — SODIUM CHLORIDE: 9 INJECTION, SOLUTION INTRAVENOUS at 03:06

## 2023-06-01 RX ADMIN — Medication 220 MCG/KG/MIN: at 03:06

## 2023-06-01 NOTE — H&P (VIEW-ONLY)
[facility]  History & Physical    Subjective:      Chief Complaint/Reason for Admission:    EGD for follow-up evaluation of gastric intestinal metaplasia last scoped by Dr. Johnson and screening colonoscopy patient states he is not constipated but may be smart today do MiraLax 17 g in 12 oz of water once daily starting 5 days prior to colonoscopy with split bowel prep.    Blaise Louis is a 58 y.o. male.    Past Medical History:   Diagnosis Date    Anxiety     Arthritis     Back pain     Cervical disc disorder with radiculopathy     Encounter for blood transfusion     Fever blister     Hepatitis C     History of acne     Joint pain     Kidney stones      Past Surgical History:   Procedure Laterality Date    ANTERIOR CERVICAL CORPECTOMY W/ FUSION      APPENDECTOMY      HIP ARTHROPLASTY Right 2019    Procedure: ARTHROPLASTY, HIP TOTAL;  Surgeon: Elliott Wynn MD;  Location: [facility];  Service: Orthopedics;  Laterality: Right;  OFIRM    SPINE SURGERY      2 c-spine surg. from a  accident     Family History   Problem Relation Age of Onset    Cancer Father         prostate cancer    Diabetes Father     Melanoma Neg Hx     Celiac disease Neg Hx     Cirrhosis Neg Hx     Colon cancer Neg Hx     Crohn's disease Neg Hx     Esophageal cancer Neg Hx     Liver cancer Neg Hx     Rectal cancer Neg Hx     Ulcerative colitis Neg Hx     Stomach cancer Neg Hx     Liver disease Neg Hx     Inflammatory bowel disease Neg Hx     Colon polyps Neg Hx     Pancreatitis Neg Hx     Pancreatic cancer Neg Hx     Uterine cancer Neg Hx     Ovarian cancer Neg Hx     Bladder Cancer Neg Hx     Kidney cancer Neg Hx      Social History     Tobacco Use    Smoking status: Former     Types: Cigarettes     Quit date: 10/9/2004     Years since quittin.6    Smokeless tobacco: Never   Substance Use Topics    Alcohol use: No     Comment: rare    Drug use: Yes     Types: Marijuana     Comment: daily        Facility-Administered Medications Prior to Admission   Medication    promethazine tablet 12.5 mg     PTA Medications   Medication Sig    clonazepam (KLONOPIN) 0.5 MG tablet Take 1 tablet (0.5 mg total) by mouth 3 (three) times daily.    clonazePAM (KLONOPIN) 1 MG tablet     gabapentin (NEURONTIN) 300 MG capsule     ibuprofen (ADVIL,MOTRIN) 800 MG tablet     methadone (DOLOPHINE) 10 MG tablet     methadone (DOLOPHINE) 10 MG tablet     methadone (DOLOPHINE) 10 mg/mL solution Take 30 mg by mouth 3 (three) times daily.     methadone (DOLOPHINE) 10 mg/mL solution Take 30 mg by mouth.    oxyCODONE (OXY-IR) 5 mg Cap Take 10 mg by mouth every 4 (four) hours as needed for Pain.     oxyCODONE (OXYCONTIN) 10 mg 12 hr tablet Take 10 mg by mouth 4 (four) times daily.    oxyCODONE (ROXICODONE) 10 mg Tab immediate release tablet     acetaminophen (TYLENOL) 500 MG tablet Take 1,000 mg by mouth every 6 (six) hours as needed for Pain.    aspirin (ECOTRIN) 325 MG EC tablet Take 1 tablet (325 mg total) by mouth 2 (two) times daily. For 4 weeks post op    LIDOcaine (LIDODERM) 5 %     ondansetron (ZOFRAN) 4 MG tablet     RANITIDINE HCL (ZANTAC ORAL) Take by mouth.    testosterone cypionate (DEPOTESTOTERONE CYPIONATE) 100 mg/mL injection Inject 160 mg into the muscle once a week.    testosterone cypionate (DEPOTESTOTERONE CYPIONATE) 200 mg/mL injection     testosterone cypionate (DEPOTESTOTERONE CYPIONATE) 200 mg/mL injection     valACYclovir (VALTREX) 1000 MG tablet      Review of patient's allergies indicates:   Allergen Reactions    Dilantin [phenytoin sodium extended] Hives        Review of Systems   Constitutional:  Negative for fever.   Respiratory:  Negative for shortness of breath.    Cardiovascular:  Negative for chest pain.     Objective:      Vital Signs (Most Recent)       Vital Signs Range (Last 24H):       Physical Exam  Neurological:      Mental Status: He is alert and oriented to person, place, and time.          Assessment:      EGD for follow-up evaluation of gastric intestinal metaplasia last scoped by Dr. Johnson and screening colonoscopy patient states he is not constipated but may be smart today do MiraLax 17 g in 12 oz of water once daily starting 5 days prior to colonoscopy with split bowel prep.      Plan:    EGD for follow-up evaluation of gastric intestinal metaplasia last scoped by Dr. Johnson and screening colonoscopy patient states he is not constipated but may be smart today do MiraLax 17 g in 12 oz of water once daily starting 5 days prior to colonoscopy with split bowel prep.

## 2023-06-01 NOTE — [employer]ATION PATIENT INSTRUCTIONS
Discharge Summary/Instructions after an Endoscopic Procedure  Patient Name: Blaise Louis  Patient MRN: [id number]  Patient YOB: 1965 Thursday, June 1, 2023  Zenon Medina MD  Dear patient,  As a result of recent federal legislation (The Federal Cures Act), you may   receive lab or pathology results from your procedure in your [facility]   account before your physician is able to contact you. Your physician or   their representative will relay the results to you with their   recommendations at their soonest availability.  Thank you,  RESTRICTIONS:  During your procedure today, you received medications for sedation.  These   medications may affect your judgment, balance and coordination.  Therefore,   for 24 hours, you have the following restrictions:   - DO NOT drive a car, operate machinery, make legal/financial decisions,   sign important papers or drink alcohol.    ACTIVITY:  Today: no heavy lifting, straining or running due to procedural   sedation/anesthesia.  The following day: return to full activity including work.  DIET:  Eat and drink normally unless instructed otherwise.     TREATMENT FOR COMMON SIDE EFFECTS:  - Mild abdominal pain, nausea, belching, bloating or excessive gas:  rest,   eat lightly and use a heating pad.  - Sore Throat: treat with throat lozenges and/or gargle with warm salt   water.  - Because air was used during the procedure, expelling large amounts of air   from your rectum or belching is normal.  - If a bowel prep was taken, you may not have a bowel movement for 1-3 days.    This is normal.  SYMPTOMS TO WATCH FOR AND REPORT TO YOUR PHYSICIAN:  1. Abdominal pain or bloating, other than gas cramps.  2. Chest pain.  3. Back pain.  4. Signs of infection such as: chills or fever occurring within 24 hours   after the procedure.  5. Rectal bleeding, which would show as bright red, maroon, or black stools.   (A tablespoon of blood from the rectum is not serious, especially if    hemorrhoids are present.)  6. Vomiting.  7. Weakness or dizziness.  GO DIRECTLY TO THE NEAREST EMERGENCY ROOM IF YOU HAVE ANY OF THE FOLLOWING:      Difficulty breathing              Chills and/or fever over 101 F   Persistent vomiting and/or vomiting blood   Severe abdominal pain   Severe chest pain   Black, tarry stools   Bleeding- more than one tablespoon   Any other symptom or condition that you feel may need urgent attention  Your doctor recommends these additional instructions:  If any biopsies were taken, your doctors clinic will contact you in 1 to 2   weeks with any results.  - Discharge patient to home.   - Repeat colonoscopy with better bowel prep at the next available   appointment because the bowel preparation was poor. O.K. for tomorrow if   availability and patient is able.  - Return to GI clinic.   - Return to primary care physician.   - The findings and recommendations were discussed with the patient.  For questions, problems or results please call your physician - Zenon Medina MD at Work:  [phone].  [facility], EMERGENCY ROOM PHONE NUMBER: [phone]  IF A COMPLICATION OR EMERGENCY SITUATION ARISES AND YOU ARE UNABLE TO REACH   YOUR PHYSICIAN - GO DIRECTLY TO THE EMERGENCY ROOM.  Zenon Medina MD  6/1/2023 4:07:37 PM  This report has been verified and signed electronically.  Dear patient,  As a result of recent federal legislation (The Federal Cures Act), you may   receive lab or pathology results from your procedure in your [facility]   account before your physician is able to contact you. Your physician or   their representative will relay the results to you with their   recommendations at their soonest availability.  Thank you,  PROVATION

## 2023-06-01 NOTE — PROVATION PATIENT INSTRUCTIONS
Discharge Summary/Instructions after an Endoscopic Procedure  Patient Name: Blaise Louis  Patient MRN: [id number]  Patient YOB: 1965 Thursday, June 1, 2023  Zenon Medina MD  Dear patient,  As a result of recent federal legislation (The Federal Cures Act), you may   receive lab or pathology results from your procedure in your [facility]   account before your physician is able to contact you. Your physician or   their representative will relay the results to you with their   recommendations at their soonest availability.  Thank you,  RESTRICTIONS:  During your procedure today, you received medications for sedation.  These   medications may affect your judgment, balance and coordination.  Therefore,   for 24 hours, you have the following restrictions:   - DO NOT drive a car, operate machinery, make legal/financial decisions,   sign important papers or drink alcohol.    ACTIVITY:  Today: no heavy lifting, straining or running due to procedural   sedation/anesthesia.  The following day: return to full activity including work.  DIET:  Eat and drink normally unless instructed otherwise.     TREATMENT FOR COMMON SIDE EFFECTS:  - Mild abdominal pain, nausea, belching, bloating or excessive gas:  rest,   eat lightly and use a heating pad.  - Sore Throat: treat with throat lozenges and/or gargle with warm salt   water.  - Because air was used during the procedure, expelling large amounts of air   from your rectum or belching is normal.  - If a bowel prep was taken, you may not have a bowel movement for 1-3 days.    This is normal.  SYMPTOMS TO WATCH FOR AND REPORT TO YOUR PHYSICIAN:  1. Abdominal pain or bloating, other than gas cramps.  2. Chest pain.  3. Back pain.  4. Signs of infection such as: chills or fever occurring within 24 hours   after the procedure.  5. Rectal bleeding, which would show as bright red, maroon, or black stools.   (A tablespoon of blood from the rectum is not serious, especially if    hemorrhoids are present.)  6. Vomiting.  7. Weakness or dizziness.  GO DIRECTLY TO THE NEAREST EMERGENCY ROOM IF YOU HAVE ANY OF THE FOLLOWING:      Difficulty breathing              Chills and/or fever over 101 F   Persistent vomiting and/or vomiting blood   Severe abdominal pain   Severe chest pain   Black, tarry stools   Bleeding- more than one tablespoon   Any other symptom or condition that you feel may need urgent attention  Your doctor recommends these additional instructions:  If any biopsies were taken, your doctors clinic will contact you in 1 to 2   weeks with any results.  - Discharge patient to home.   - Follow an antireflux regimen indefinitely.   - Await pathology results.   - Telephone endoscopist for pathology results in 3 weeks.   - Return to GI clinic at the next available appointment.   - Repeat upper endoscopy in 1 year for surveillance based on pathology   results.   - Return to primary care physician.   - The findings and recommendations were discussed with the patient.  For questions, problems or results please call your physician - Zenon Medina MD at Work:  [phone].  [facility], EMERGENCY ROOM PHONE NUMBER: [phone]  IF A COMPLICATION OR EMERGENCY SITUATION ARISES AND YOU ARE UNABLE TO REACH   YOUR PHYSICIAN - GO DIRECTLY TO THE EMERGENCY ROOM.  Zenon Medina MD  6/1/2023 4:37:57 PM  This report has been verified and signed electronically.  Dear patient,  As a result of recent federal legislation (The Federal Cures Act), you may   receive lab or pathology results from your procedure in your [facility]   account before your physician is able to contact you. Your physician or   their representative will relay the results to you with their   recommendations at their soonest availability.  Thank you,  PROVATION

## 2023-06-01 NOTE — TRANSFER OF CARE
"Anesthesia Transfer of Care Note    Patient: Blaise Louis    Procedure(s) Performed: Procedure(s) (LRB):  EGD (ESOPHAGOGASTRODUODENOSCOPY) (N/A)  COLONOSCOPY (N/A)    Patient location: PACU    Anesthesia Type: general    Transport from OR: Transported from OR on room air with adequate spontaneous ventilation. Transported from OR on 2-3 L/min O2 by NC with adequate spontaneous ventilation    Post pain: adequate analgesia    Post assessment: no apparent anesthetic complications    Post vital signs: stable    Level of consciousness: awake    Nausea/Vomiting: no nausea/vomiting    Complications: none    Transfer of care protocol was followed      Last vitals:   Visit Vitals  BP (!) 86/52 (BP Location: Left arm, Patient Position: Lying)   Pulse (!) 57   Temp 36.6 °C (97.9 °F)   Resp 16   Ht 5' 10" (1.778 m)   Wt 64.4 kg (141 lb 15.6 oz)   SpO2 (!) 93%   BMI 20.37 kg/m²     "
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
## 2023-06-02 ENCOUNTER — TELEPHONE (OUTPATIENT)
Dept: ENDOSCOPY | Facility: HOSPITAL | Age: 58
End: 2023-06-02
Payer: MEDICARE

## 2023-06-02 VITALS — HEIGHT: 70 IN | BODY MASS INDEX: 20.19 KG/M2 | WEIGHT: 141 LBS

## 2023-06-02 DIAGNOSIS — Z12.11 COLON CANCER SCREENING: Primary | ICD-10-CM

## 2023-06-02 RX ORDER — SODIUM, POTASSIUM,MAG SULFATES 17.5-3.13G
SOLUTION, RECONSTITUTED, ORAL ORAL
Qty: 2 KIT | Refills: 0 | Status: ON HOLD | OUTPATIENT
Start: 2023-06-02 | End: 2023-06-13 | Stop reason: HOSPADM

## 2023-06-02 NOTE — TELEPHONE ENCOUNTER
"Zenon Medina MD  [facility] Patients  Caller: Unspecified (Yesterday,  5:51 PM)  Procedure: Colonoscopy     Diagnosis: Screening colonoscopy     Procedure Timin-4 weeks     *If within 4 weeks selected, please lokesh as high priority*     *If greater than 12 weeks, please select "5-12 weeks" and delay sending until 2 months prior to requested date*     Provider: Any endoscopist     Location: [address]     Additional Scheduling Information:  Constipation bowel prep protocol history of prior poor bowel prep     Prep Specifications:Extended/Constipation prep     Have you attached a patient to this message: yes   "

## 2023-06-07 LAB
FINAL PATHOLOGIC DIAGNOSIS: NORMAL
GROSS: NORMAL
Lab: NORMAL
MICROSCOPIC EXAM: NORMAL

## 2023-06-12 ENCOUNTER — TELEPHONE (OUTPATIENT)
Dept: ENDOSCOPY | Facility: HOSPITAL | Age: 58
End: 2023-06-12
Payer: MEDICARE

## 2023-06-12 DIAGNOSIS — R11.0 NAUSEA: Primary | ICD-10-CM

## 2023-06-12 RX ORDER — ONDANSETRON 4 MG/1
8 TABLET, FILM COATED ORAL EVERY 8 HOURS PRN
Qty: 2 TABLET | Refills: 0 | Status: SHIPPED | OUTPATIENT
Start: 2023-06-12

## 2023-06-12 NOTE — TELEPHONE ENCOUNTER
"----- Message from Robby Viveros sent at 6/12/2023  2:03 PM CDT -----  Consult/Advisory:          Name Of Caller: [facility] #[id number] - [place], [place]         Contact Preference?:   [phone]  Fax: [phone]        Provider Name: Carol      Does patient feel the need to be seen today? No      What is the nature of the call?: Requesting clarification of quantity for ondansetron (ZOFRAN) 4 MG tablet refill          Additional Notes:  "Thank you for all that you do for our patients"      "

## 2023-06-12 NOTE — TELEPHONE ENCOUNTER
----- Message from Niki Cardenas sent at 6/12/2023 10:34 AM CDT -----  Regarding: Prescription  Contact: [phone]  Calling to request a prescription for nausea(Phenergan) sent to [facility] pharmacy in [facility]. Patient is experiencing extreme nausea due to prep that must be taken today and tomorrow for procedure. Please call to confirm    [facility] #[id number] - [address] AT SEC OF [address] [address]  Phone: [phone] Fax: [phone]

## 2023-06-13 ENCOUNTER — ANESTHESIA (OUTPATIENT)
Dept: ENDOSCOPY | Facility: HOSPITAL | Age: 58
End: 2023-06-13
Payer: MEDICARE

## 2023-06-13 ENCOUNTER — ANESTHESIA EVENT (OUTPATIENT)
Dept: ENDOSCOPY | Facility: HOSPITAL | Age: 58
End: 2023-06-13
Payer: MEDICARE

## 2023-06-13 ENCOUNTER — HOSPITAL ENCOUNTER (OUTPATIENT)
Facility: HOSPITAL | Age: 58
Discharge: HOME | End: 2023-06-13
Attending: INTERNAL MEDICINE | Admitting: INTERNAL MEDICINE
Payer: MEDICARE

## 2023-06-13 VITALS
RESPIRATION RATE: 16 BRPM | OXYGEN SATURATION: 99 % | HEART RATE: 86 BPM | HEIGHT: 70 IN | BODY MASS INDEX: 21.47 KG/M2 | SYSTOLIC BLOOD PRESSURE: 138 MMHG | TEMPERATURE: 98 F | DIASTOLIC BLOOD PRESSURE: 73 MMHG | WEIGHT: 150 LBS

## 2023-06-13 DIAGNOSIS — Z12.11 COLON CANCER SCREENING: Primary | ICD-10-CM

## 2023-06-13 PROCEDURE — 88305 TISSUE EXAM BY PATHOLOGIST: CPT | Mod: 26,,, | Performed by: STUDENT IN AN ORGANIZED HEALTH CARE EDUCATION/TRAINING PROGRAM

## 2023-06-13 PROCEDURE — 25000003 PHARM REV CODE 250: Performed by: INTERNAL MEDICINE

## 2023-06-13 PROCEDURE — 37000008 HC ANESTHESIA 1ST 15 MINUTES: Performed by: INTERNAL MEDICINE

## 2023-06-13 PROCEDURE — E9220 PRA ENDO ANESTHESIA: HCPCS | Mod: PT,,, | Performed by: NURSE ANESTHETIST, CERTIFIED REGISTERED

## 2023-06-13 PROCEDURE — 37000009 HC ANESTHESIA EA ADD 15 MINS: Performed by: INTERNAL MEDICINE

## 2023-06-13 PROCEDURE — 88305 TISSUE EXAM BY PATHOLOGIST: ICD-10-PCS | Mod: 26,,, | Performed by: STUDENT IN AN ORGANIZED HEALTH CARE EDUCATION/TRAINING PROGRAM

## 2023-06-13 PROCEDURE — 88305 TISSUE EXAM BY PATHOLOGIST: CPT | Mod: 59 | Performed by: STUDENT IN AN ORGANIZED HEALTH CARE EDUCATION/TRAINING PROGRAM

## 2023-06-13 PROCEDURE — E9220 PRA ENDO ANESTHESIA: ICD-10-PCS | Mod: PT,,, | Performed by: NURSE ANESTHETIST, CERTIFIED REGISTERED

## 2023-06-13 PROCEDURE — 25000003 PHARM REV CODE 250: Performed by: NURSE ANESTHETIST, CERTIFIED REGISTERED

## 2023-06-13 PROCEDURE — 45385 PR COLONOSCOPY,REMV LESN,SNARE: ICD-10-PCS | Mod: PT,,, | Performed by: INTERNAL MEDICINE

## 2023-06-13 PROCEDURE — 27201012 HC FORCEPS, HOT/COLD, DISP: Performed by: INTERNAL MEDICINE

## 2023-06-13 PROCEDURE — 45380 PR COLONOSCOPY,BIOPSY: ICD-10-PCS | Mod: PT,59,, | Performed by: INTERNAL MEDICINE

## 2023-06-13 PROCEDURE — 45385 COLONOSCOPY W/LESION REMOVAL: CPT | Mod: PT | Performed by: INTERNAL MEDICINE

## 2023-06-13 PROCEDURE — 45380 COLONOSCOPY AND BIOPSY: CPT | Mod: PT,59 | Performed by: INTERNAL MEDICINE

## 2023-06-13 PROCEDURE — 45380 COLONOSCOPY AND BIOPSY: CPT | Mod: PT,59,, | Performed by: INTERNAL MEDICINE

## 2023-06-13 PROCEDURE — 45385 COLONOSCOPY W/LESION REMOVAL: CPT | Mod: PT,,, | Performed by: INTERNAL MEDICINE

## 2023-06-13 RX ORDER — SODIUM CHLORIDE 9 MG/ML
INJECTION, SOLUTION INTRAVENOUS CONTINUOUS
Status: DISCONTINUED | OUTPATIENT
Start: 2023-06-13 | End: 2023-06-13 | Stop reason: HOSPADM

## 2023-06-13 RX ORDER — PROPOFOL 10 MG/ML
INJECTION, EMULSION INTRAVENOUS
Status: DISCONTINUED | OUTPATIENT
Start: 2023-06-13 | End: 2023-06-13

## 2023-06-13 RX ORDER — PROPOFOL 10 MG/ML
INJECTION, EMULSION INTRAVENOUS CONTINUOUS PRN
Status: DISCONTINUED | OUTPATIENT
Start: 2023-06-13 | End: 2023-06-13

## 2023-06-13 RX ORDER — LIDOCAINE HYDROCHLORIDE 20 MG/ML
INJECTION INTRAVENOUS
Status: DISCONTINUED | OUTPATIENT
Start: 2023-06-13 | End: 2023-06-13

## 2023-06-13 RX ADMIN — SODIUM CHLORIDE: 0.9 INJECTION, SOLUTION INTRAVENOUS at 12:06

## 2023-06-13 RX ADMIN — PROPOFOL 150 MCG/KG/MIN: 10 INJECTION, EMULSION INTRAVENOUS at 12:06

## 2023-06-13 RX ADMIN — LIDOCAINE HYDROCHLORIDE 100 MG: 20 INJECTION INTRAVENOUS at 12:06

## 2023-06-13 RX ADMIN — PROPOFOL 100 MG: 10 INJECTION, EMULSION INTRAVENOUS at 12:06

## 2023-06-13 NOTE — INTERVAL H&P NOTE
The patient has been examined and the H&P has been reviewed:    Pre-Procedure H and P Addendum    Patient seen and examined.  History and exam unchanged from prior history and physical.  Recent colonoscopy with inadequate bowel preparation.     Procedure: Colonoscopy   Indication: Colon cancer screening  ASA Class: per anesthesiology  Airway: normal  Neck Mobility: full range of motion  Mallampatti score: per anesthesia  History of anesthesia problems: no  Family history of anesthesia problems: no  Anesthesia Plan: MAC
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
## 2023-06-13 NOTE — ANESTHESIA POSTPROCEDURE EVALUATION
Anesthesia Post Evaluation    Patient: Blaise Louis    Procedure(s) Performed: Procedure(s) (LRB):  COLONOSCOPY (N/A)    Final Anesthesia Type: general      Patient location during evaluation: PACU  Patient participation: Yes- Able to Participate  Level of consciousness: awake and alert  Post-procedure vital signs: reviewed and stable  Pain management: adequate  Airway patency: patent    PONV status at discharge: No PONV  Anesthetic complications: no      Cardiovascular status: hemodynamically stable  Respiratory status: spontaneous ventilation  Follow-up not needed.          Vitals Value Taken Time   /58 06/13/23 1326   Temp 36.7 °C (98 °F) 06/13/23 1326   Pulse 58 06/13/23 1326   Resp 16 06/13/23 1326   SpO2 98 % 06/13/23 1326         No case tracking events are documented in the log.      Pain/Charles Score: Charles Score: 10 (6/13/2023  1:26 PM)

## 2023-06-13 NOTE — TRANSFER OF CARE
"Anesthesia Transfer of Care Note    Patient: Blaise Louis    Procedure(s) Performed: Procedure(s) (LRB):  COLONOSCOPY (N/A)    Patient location: GI    Anesthesia Type: general    Transport from OR: Transported from OR on room air with adequate spontaneous ventilation    Post pain: adequate analgesia    Post assessment: no apparent anesthetic complications    Post vital signs: stable    Level of consciousness: sedated    Nausea/Vomiting: no nausea/vomiting    Complications: none    Transfer of care protocol was followed      Last vitals:   Visit Vitals  BP (!) 101/58   Pulse (!) 58   Temp 36.7 °C (98 °F)   Resp 10   Ht 5' 10" (1.778 m)   Wt 68 kg (150 lb)   SpO2 95%   BMI 21.52 kg/m²     "

## 2023-06-13 NOTE — PROVATION PATIENT INSTRUCTIONS
Discharge Summary/Instructions after an Endoscopic Procedure  Patient Name: Blaise Louis  Patient MRN: [id number]  Patient YOB: 1965 Tuesday, June 13, 2023  Hunter Torres MD  Dear patient,  As a result of recent federal legislation (The Federal Cures Act), you may   receive lab or pathology results from your procedure in your [facility]   account before your physician is able to contact you. Your physician or   their representative will relay the results to you with their   recommendations at their soonest availability.  Thank you,  RESTRICTIONS:  During your procedure today, you received medications for sedation.  These   medications may affect your judgment, balance and coordination.  Therefore,   for 24 hours, you have the following restrictions:   - DO NOT drive a car, operate machinery, make legal/financial decisions,   sign important papers or drink alcohol.    ACTIVITY:  Today: no heavy lifting, straining or running due to procedural   sedation/anesthesia.  The following day: return to full activity including work.  DIET:  Eat and drink normally unless instructed otherwise.     TREATMENT FOR COMMON SIDE EFFECTS:  - Mild abdominal pain, nausea, belching, bloating or excessive gas:  rest,   eat lightly and use a heating pad.  - Sore Throat: treat with throat lozenges and/or gargle with warm salt   water.  - Because air was used during the procedure, expelling large amounts of air   from your rectum or belching is normal.  - If a bowel prep was taken, you may not have a bowel movement for 1-3 days.    This is normal.  SYMPTOMS TO WATCH FOR AND REPORT TO YOUR PHYSICIAN:  1. Abdominal pain or bloating, other than gas cramps.  2. Chest pain.  3. Back pain.  4. Signs of infection such as: chills or fever occurring within 24 hours   after the procedure.  5. Rectal bleeding, which would show as bright red, maroon, or black stools.   (A tablespoon of blood from the rectum is not serious, especially if    hemorrhoids are present.)  6. Vomiting.  7. Weakness or dizziness.  GO DIRECTLY TO THE NEAREST EMERGENCY ROOM IF YOU HAVE ANY OF THE FOLLOWING:      Difficulty breathing              Chills and/or fever over 101 F   Persistent vomiting and/or vomiting blood   Severe abdominal pain   Severe chest pain   Black, tarry stools   Bleeding- more than one tablespoon   Any other symptom or condition that you feel may need urgent attention  Your doctor recommends these additional instructions:  If any biopsies were taken, your doctors clinic will contact you in 1 to 2   weeks with any results.  - Discharge patient to home.   - Patient has a contact number available for emergencies.  The signs and   symptoms of potential delayed complications were discussed with the   patient.  Return to normal activities tomorrow.  Written discharge   instructions were provided to the patient.   - Resume previous diet.   - Continue present medications.   - Await pathology results.   - Repeat colonoscopy in 5 years for surveillance.   - Return to referring physician.  For questions, problems or results please call your physician - Hunter Torres MD at Work:  [phone].  [facility], EMERGENCY ROOM PHONE NUMBER: [phone]  IF A COMPLICATION OR EMERGENCY SITUATION ARISES AND YOU ARE UNABLE TO REACH   YOUR PHYSICIAN - GO DIRECTLY TO THE EMERGENCY ROOM.  Hunter Torres MD  6/13/2023 1:21:30 PM  This report has been verified and signed electronically.  Dear patient,  As a result of recent federal legislation (The Federal Cures Act), you may   receive lab or pathology results from your procedure in your [facility]   account before your physician is able to contact you. Your physician or   their representative will relay the results to you with their   recommendations at their soonest availability.  Thank you,  PROVATION

## 2023-06-16 LAB
COMMENT: NORMAL
FINAL PATHOLOGIC DIAGNOSIS: NORMAL
GROSS: NORMAL
Lab: NORMAL

## 2023-07-02 NOTE — PROGRESS NOTES
"Blaise your EGD pathology was benign no celiac sprue no H pylori no dysplasia.    Recommend you next surveillance EGD in 1 year    However your EGD biopsies show gastric "intestinal metaplasia" which is a "risk factor" for gastric cancer.      There is No evidence of stomach cancer and No evidence of dysplasia.  It's only a risk factor.      No H. Pylori on histology from the biopsies.     So consider avoiding BBQ foods, Pickled foods and Salty foods and to eat more fresh fruits and vegetables.    1. Duodenum, biopsy:   - Duodenal mucosa with focal gastric foveolar metaplasia, Brunner gland hyperplasia, and mild reactive changes, may represent mild peptic duodenitis   - No evidence of celiac disease   - Negative for dysplasia or carcinoma     2. Stomach, biopsy:   - Gastric antral and oxyntic mucosa with features suggestive of reactive gastropathy   - Intestinal metaplasia present focally in antral mucosa, negative for dysplasia   - Focal oxyntic glandular dilatation   - No Helicobacter pylori identified by immunohistochemistry   - Negative for dysplasia or carcinoma     3. Esophagus, distal, biopsy:   - Gastric cardia-type mucosa, chronic carditis (see comment)   - Esophageal squamous mucosa, chronic esophagitis   - No significantly increased eosinophils   - Negative for intestinal metaplasia   - Negative for dysplasia or carcinoma   - Multiple H&E levels evaluated     Comment:   The endoscopic impression of an oval island in the distal esophagus is reviewed.  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  Clinical and endoscopic correlation is required.    Comment: Interp By Reji Rodriguez M.D., Signed on 06/07/2023         "

## 2023-07-09 ENCOUNTER — PATIENT MESSAGE (OUTPATIENT)
Dept: GASTROENTEROLOGY | Facility: CLINIC | Age: 58
End: 2023-07-09
Payer: MEDICARE

## 2023-08-08 ENCOUNTER — TELEPHONE (OUTPATIENT)
Dept: ENDOSCOPY | Facility: HOSPITAL | Age: 58
End: 2023-08-08
Payer: MEDICARE

## 2023-08-08 NOTE — TELEPHONE ENCOUNTER
----- Message from Sinai Kate sent at 8/8/2023  9:06 AM CDT -----  Regarding: Follow up, still has blood when straining. needs follow up video call appt  Contact: @[phone]  Follow up, pt still has blood when straining, please call and advise for video visit @[phone]    Blood not coming from within, but the just beneath the surface of the skin (quarter inch) possible fistula or other- pt heard about non invasive procedure, advise if it needed if it needs to be in person.     Blood had been going away, now coming back.

## 2024-01-23 ENCOUNTER — TELEPHONE (OUTPATIENT)
Dept: GASTROENTEROLOGY | Facility: CLINIC | Age: 59
End: 2024-01-23
Payer: MEDICARE

## 2024-01-23 NOTE — TELEPHONE ENCOUNTER
He says that he is having problems with BRBPR and burning. I offered to schedule in CRS. But he was insistent that you told him you that could fix. He wanted me to message you

## 2024-01-23 NOTE — TELEPHONE ENCOUNTER
----- Message from Charlene Covarrubias sent at 1/23/2024  2:33 PM CST -----  Regarding: appt access  Contact: pt [phone]  Pt calling to request appt for rectal bleeding. Pls call

## 2024-01-24 ENCOUNTER — PATIENT MESSAGE (OUTPATIENT)
Dept: SURGERY | Facility: CLINIC | Age: 59
End: 2024-01-24
Payer: MEDICARE

## 2024-02-06 ENCOUNTER — OFFICE VISIT (OUTPATIENT)
Dept: SURGERY | Facility: CLINIC | Age: 59
End: 2024-02-06
Payer: MEDICARE

## 2024-02-06 VITALS
WEIGHT: 143.31 LBS | SYSTOLIC BLOOD PRESSURE: 134 MMHG | HEART RATE: 85 BPM | BODY MASS INDEX: 20.52 KG/M2 | HEIGHT: 70 IN | DIASTOLIC BLOOD PRESSURE: 85 MMHG

## 2024-02-06 DIAGNOSIS — K60.2 ANAL FISSURE: ICD-10-CM

## 2024-02-06 DIAGNOSIS — K62.89 ANAL PAIN: ICD-10-CM

## 2024-02-06 DIAGNOSIS — K59.03 THERAPEUTIC OPIOID INDUCED CONSTIPATION: Primary | ICD-10-CM

## 2024-02-06 DIAGNOSIS — T40.2X5A THERAPEUTIC OPIOID INDUCED CONSTIPATION: Primary | ICD-10-CM

## 2024-02-06 DIAGNOSIS — K64.8 INTERNAL HEMORRHOIDS: ICD-10-CM

## 2024-02-06 PROCEDURE — 46600 DIAGNOSTIC ANOSCOPY SPX: CPT | Mod: PBBFAC | Performed by: NURSE PRACTITIONER

## 2024-02-06 PROCEDURE — 46600 DIAGNOSTIC ANOSCOPY SPX: CPT | Mod: S$PBB,ICN,, | Performed by: NURSE PRACTITIONER

## 2024-02-06 PROCEDURE — 99215 OFFICE O/P EST HI 40 MIN: CPT | Mod: PBBFAC | Performed by: NURSE PRACTITIONER

## 2024-02-06 PROCEDURE — 99214 OFFICE O/P EST MOD 30 MIN: CPT | Mod: 25,S$PBB,ICN, | Performed by: NURSE PRACTITIONER

## 2024-02-06 PROCEDURE — 99999 PR PBB SHADOW E&M-EST. PATIENT-LVL V: CPT | Mod: PBBFAC,,, | Performed by: NURSE PRACTITIONER

## 2024-02-06 RX ORDER — LUBIPROSTONE 24 UG/1
24 CAPSULE ORAL 2 TIMES DAILY WITH MEALS
Qty: 60 CAPSULE | Refills: 11 | Status: SHIPPED | OUTPATIENT
Start: 2024-02-06 | End: 2025-01-31

## 2024-02-06 RX ORDER — HYDROCORTISONE ACETATE 25 MG/1
25 SUPPOSITORY RECTAL 2 TIMES DAILY
Qty: 24 SUPPOSITORY | Refills: 1 | Status: SHIPPED | OUTPATIENT
Start: 2024-02-06 | End: 2024-03-01

## 2024-02-06 RX ORDER — LIDOCAINE 50 MG/G
OINTMENT TOPICAL
Qty: 106.32 EACH | Refills: 2 | Status: SHIPPED | OUTPATIENT
Start: 2024-02-06

## 2024-02-06 NOTE — PROGRESS NOTES
CRS Office Visit History and Physical    Referring Md:   Self, Aaarefshiraal  No address on file    SUBJECTIVE:     Chief Complaint: anal pain    History of Present Illness:  The patient is new patient to this practice.   Course is as follows:  Patient is a 59 y.o. male presents with anal pain. Possible fissure noted on colonoscopy in June 2023.  +burning pain. +brbpr with every bm.   Symptoms have been present for 2  yrs.  Has tried prep h and lidocaine.  Associated bleeding: yes  Previous anorectal procedures: No  confirms straining/prolonged time on toilet with bowel movements.  On methadone/oxycodone for 30+ yrs pain management.  Can go 4-5 days w/o a bm if does not take anything to assist with constipation.   is currently taking fiber supplement, stool softener, laxatives, all without improvement of constipation.   Will get liquid diarrhea after taking 4 dulcolax. This hurts/bleeds as well.  Blood thinners: No    Last Colonoscopy completed on 6/13/2023  - Anal fissure found on perianal exam.   - Two 1 to 2 mm polyps in the cecum, removed with a jumbo cold forceps. Resected and retrieved.   - One 5 mm polyp in the ascending colon, removed with a cold snare. Resected and retrieved.   - One 3 mm polyp in the descending colon, removed with a cold snare. Resected and retrieved.   - Congested mucosa in the entire examined colon. Biopsied.   - The examination was otherwise normal on direct and retroflexion views.   - The examined portion of the ileum was normal.   - repeat in 5 years      Review of patient's allergies indicates:   Allergen Reactions    Dilantin [phenytoin sodium extended] Hives       Past Medical History:   Diagnosis Date    Anxiety     Arthritis     Back pain     Cervical disc disorder with radiculopathy     Encounter for blood transfusion     Fever blister     Hepatitis C     History of acne     Joint pain     Kidney stones      Past Surgical History:   Procedure Laterality Date    ANTERIOR CERVICAL  CORPECTOMY W/ FUSION      APPENDECTOMY      COLONOSCOPY N/A 6/1/2023    Procedure: COLONOSCOPY;  Surgeon: Zenon Medina MD;  Location: [facility] (4TH FLR);  Service: Endoscopy;  Laterality: N/A;  pt request [facility] 1st available, Miralax x 5 days prior, suprep, prep instr to portal-MS    COLONOSCOPY N/A 6/13/2023    Procedure: COLONOSCOPY;  Surgeon: Hunter Torres MD;  Location: [facility] (4TH FLR);  Service: Endoscopy;  Laterality: N/A;  hx poor prep, extended suprep, pt request suprep, instructions portal,proc order tele encounter 6/2/23, Referral: ZENON MEDINA    6/7/23 no answer for pre call -eh  pt confirmed earlier time 6/12 EB    ESOPHAGOGASTRODUODENOSCOPY N/A 6/1/2023    Procedure: EGD (ESOPHAGOGASTRODUODENOSCOPY);  Surgeon: Zenon Medina MD;  Location: [facility] (4TH FLR);  Service: Endoscopy;  Laterality: N/A;  Endoscopy schedulers patient would like 1st provider available for EGD for history of gastric intestinal metaplasia some nausea and screening colonoscopy patient states that he is not constipated and does not need a constipation bowel prep he may want     HIP ARTHROPLASTY Right 12/9/2019    Procedure: ARTHROPLASTY, HIP TOTAL;  Surgeon: Elliott Wynn MD;  Location: [facility];  Service: Orthopedics;  Laterality: Right;  [facility]    SPINE SURGERY      2 c-spine surg. from a 1991 accident     Family History   Problem Relation Age of Onset    Cancer Father         prostate cancer    Diabetes Father     Melanoma Neg Hx     Celiac disease Neg Hx     Cirrhosis Neg Hx     Colon cancer Neg Hx     Crohn's disease Neg Hx     Esophageal cancer Neg Hx     Liver cancer Neg Hx     Rectal cancer Neg Hx     Ulcerative colitis Neg Hx     Stomach cancer Neg Hx     Liver disease Neg Hx     Inflammatory bowel disease Neg Hx     Colon polyps Neg Hx     Pancreatitis Neg Hx     Pancreatic cancer Neg Hx     Uterine cancer Neg Hx     Ovarian cancer Neg Hx     Bladder Cancer Neg Hx     Kidney cancer Neg Hx   "    Social History     Tobacco Use    Smoking status: Former     Current packs/day: 0.00     Types: Cigarettes     Quit date: 10/9/2004     Years since quittin.3    Smokeless tobacco: Never   Substance Use Topics    Alcohol use: No     Comment: rare    Drug use: Yes     Types: Marijuana     Comment: daily        Review of Systems:  Review of Systems   Gastrointestinal:  Positive for blood in stool, constipation and diarrhea.        Anal pain       OBJECTIVE:     Vital Signs (Most Recent)  Blood Pressure 134/85 (BP Location: Left arm, Patient Position: Sitting, BP Method: Medium (Automatic))   Pulse 85   Height 5' 10" (1.778 m)   Weight 65 kg (143 lb 4.8 oz)   Body Mass Index 20.56 kg/m²     Physical Exam:  General: White male in no distress   Neuro: Alert and oriented to person, place, and time.  Moves all extremities.     HEENT: No icterus.  Trachea midline  Respiratory: Respirations are even and unlabored, no cough or audible wheezing  Skin: Warm dry and intact, No visible rashes, no jaundice    Labs reviewed today:  Lab Results   Component Value Date    WBC 5.37 06/10/2022    HGB 15.8 06/10/2022    HCT 47.0 06/10/2022     06/10/2022    CHOL 145 2015    TRIG 74 2015    HDL 32 (L) 2015    ALT 11 06/10/2022    AST 16 06/10/2022     06/10/2022    K 3.6 06/10/2022     06/10/2022    CREATININE 0.8 06/10/2022    BUN 12 06/10/2022    CO2 24 06/10/2022    TSH 1.729 06/10/2022    PSA 0.19 2012    HGBA1C 5.6 2020       Imaging reviewed today:  none    Endoscopy reviewed today:  Last Colonoscopy completed on 2023  - Anal fissure found on perianal exam.   - Two 1 to 2 mm polyps in the cecum, removed with a jumbo cold forceps. Resected and retrieved.   - One 5 mm polyp in the ascending colon, removed with a cold snare. Resected and retrieved.   - One 3 mm polyp in the descending colon, removed with a cold snare. Resected and retrieved.   - Congested mucosa in the " entire examined colon. Biopsied.   - The examination was otherwise normal on direct and retroflexion views.   - The examined portion of the ileum was normal.   - repeat in 5 years    Anorectal Exam:    Anal Skin: External hemorrhoids, small, non thrombosed, non ttp  - no fissure noted    Digital Rectal Exam:  Resting Tone normal  Mass none  Tenderness  absent    Anoscopy:  Verbal consent was obtained.   Clear plastic anoscope inserted.    Hemorrhoids  present  Stigmata of bleeding  present  Stigmata of prolapsed  present  Distal rectal mucosa normal      ASSESSMENT/PLAN:     Diagnoses and all orders for this visit:    Therapeutic opioid induced constipation  -     lubiprostone (AMITIZA) 24 MCG Cap; Take 1 capsule (24 mcg total) by mouth 2 (two) times daily with meals.    Anal pain  -     LIDOcaine (XYLOCAINE) 5 % Oint ointment; Apply topically as needed (anal pain).    Anal fissure  -     diltiazem HCl (DILTIAZEM 2% CREAM); Apply topically 3 (three) times daily. Apply topically to anal area.    Internal hemorrhoids  -     hydrocortisone (ANUSOL-HC) 25 mg suppository; Place 1 suppository (25 mg total) rectally 2 (two) times daily. for 24 days        The patient was instructed to:  Anusol bid for 2 weeks.  Amitiza bid   F/u for rbl if no improvement        GABY Layne-ANI  Colon and Rectal Surgery

## 2024-02-06 NOTE — PATIENT INSTRUCTIONS
Anusol suppositories 2x/day for 12 days  Lidocaine as needed for pain. Can burn just fyi.  Stop senna and colace.   Start Amitiza 2x/day with meals.   Message/call if no improvement, can consider rubber band ligation of internal hemorrhoids at that time.

## (undated) DEVICE — TRAY FOLEY 16FR INFECTION CONT

## (undated) DEVICE — UNDERGLOVE BIOGEL PI SZ 6.5 LF

## (undated) DEVICE — COVER BACK TABLE 72X21

## (undated) DEVICE — MASK FLYTE HOOD PEEL AWAY

## (undated) DEVICE — Device

## (undated) DEVICE — SEE MEDLINE ITEM 157117

## (undated) DEVICE — SUT VICRYL+ 1 CTX 18IN VIOL

## (undated) DEVICE — SOL IRR NACL .9% 3000ML

## (undated) DEVICE — SPONGE LAP 18X18 PREWASHED

## (undated) DEVICE — UNDERGLOVES BIOGEL PI SZ 7 LF

## (undated) DEVICE — DRESSING LEUKOPLAST FLEX 1X3IN

## (undated) DEVICE — PILLOW ABDUCTION FOAM MED

## (undated) DEVICE — UNDERGLOVES BIOGEL PI SIZE 8.5

## (undated) DEVICE — SOL 9P NACL IRR PIC IL

## (undated) DEVICE — DRESSING AQUACEL AG 3.5X10IN

## (undated) DEVICE — SUT STRATAFIX PDS 1 CTX 18IN

## (undated) DEVICE — SHEET HIP ABSORB CIRC ELAS 8

## (undated) DEVICE — PULSAVAC ZIMMER

## (undated) DEVICE — STAPLER SKIN PROXIMATE WIDE

## (undated) DEVICE — PAD UNDERPAD 30X30

## (undated) DEVICE — DRAPE CAMERA/VIDEO 5 X 96

## (undated) DEVICE — SUT ETHIBOND EXCEL 5-0 V-40

## (undated) DEVICE — SPONGE GAUZE 16PLY 4X4

## (undated) DEVICE — CONTAINER SPECIMEN STRL 4OZ

## (undated) DEVICE — GLOVE BIOGEL SKINSENSE PI 8.5

## (undated) DEVICE — POSITIONER IV ARMBOARD FOAM

## (undated) DEVICE — NDL 21GA X1 1/2 REG BEVEL

## (undated) DEVICE — ELECTRODE REM PLYHSV RETURN 9

## (undated) DEVICE — GLOVE BIOGEL SKINSENSE PI 7.0

## (undated) DEVICE — SEE MEDLINE ITEM 153151

## (undated) DEVICE — DRAPE STERI INSTRUMENT 1018

## (undated) DEVICE — DRAPE STERI U-SHAPED 47X51IN

## (undated) DEVICE — APPLICATOR CHLORAPREP ORN 26ML

## (undated) DEVICE — COVER MAYO STAND REINFRCD 30

## (undated) DEVICE — ALCOHOL 70% ISOP W/GREEN 16OZ

## (undated) DEVICE — SUT VICRYL PLUS 2-0 CT1 18

## (undated) DEVICE — DRAPE INCISE IOBAN 2 23X17IN

## (undated) DEVICE — SEE MEDLINE ITEM 157131

## (undated) DEVICE — BLADE SAG DUAL 18MMX1.27MMX90M